# Patient Record
Sex: MALE | Race: BLACK OR AFRICAN AMERICAN | NOT HISPANIC OR LATINO | URBAN - METROPOLITAN AREA
[De-identification: names, ages, dates, MRNs, and addresses within clinical notes are randomized per-mention and may not be internally consistent; named-entity substitution may affect disease eponyms.]

---

## 2017-03-19 ENCOUNTER — HOSPITAL ENCOUNTER (EMERGENCY)
Facility: HOSPITAL | Age: 22
Discharge: HOME | End: 2017-03-19
Attending: EMERGENCY MEDICINE
Payer: COMMERCIAL

## 2017-03-19 VITALS
TEMPERATURE: 98 F | BODY MASS INDEX: 25.73 KG/M2 | WEIGHT: 190 LBS | HEIGHT: 72 IN | HEART RATE: 118 BPM | OXYGEN SATURATION: 98 % | DIASTOLIC BLOOD PRESSURE: 86 MMHG | SYSTOLIC BLOOD PRESSURE: 151 MMHG | RESPIRATION RATE: 20 BRPM

## 2017-03-19 DIAGNOSIS — J06.9 UPPER RESPIRATORY TRACT INFECTION, UNSPECIFIED TYPE: ICD-10-CM

## 2017-03-19 DIAGNOSIS — R50.9 FEVER, UNSPECIFIED FEVER CAUSE: Primary | ICD-10-CM

## 2017-03-19 LAB
DEPRECATED S PYO AG THROAT QL EIA: NEGATIVE
FLUAV AG SPEC QL IA: NEGATIVE
FLUBV AG SPEC QL IA: NEGATIVE
HIV1+2 IGG SERPL QL IA.RAPID: NEGATIVE
SPECIMEN SOURCE: NORMAL

## 2017-03-19 PROCEDURE — 87147 CULTURE TYPE IMMUNOLOGIC: CPT

## 2017-03-19 PROCEDURE — 99283 EMERGENCY DEPT VISIT LOW MDM: CPT

## 2017-03-19 PROCEDURE — 86701 HIV-1ANTIBODY: CPT

## 2017-03-19 PROCEDURE — 87081 CULTURE SCREEN ONLY: CPT

## 2017-03-19 PROCEDURE — 87880 STREP A ASSAY W/OPTIC: CPT

## 2017-03-19 PROCEDURE — 25000003 PHARM REV CODE 250: Performed by: EMERGENCY MEDICINE

## 2017-03-19 PROCEDURE — 87400 INFLUENZA A/B EACH AG IA: CPT

## 2017-03-19 RX ORDER — NAPROXEN 500 MG/1
500 TABLET ORAL 2 TIMES DAILY WITH MEALS
Qty: 30 TABLET | Refills: 0 | OUTPATIENT
Start: 2017-03-19 | End: 2023-10-07

## 2017-03-19 RX ORDER — ACETAMINOPHEN 500 MG
1000 TABLET ORAL
Status: COMPLETED | OUTPATIENT
Start: 2017-03-19 | End: 2017-03-19

## 2017-03-19 RX ADMIN — ACETAMINOPHEN 1000 MG: 500 TABLET ORAL at 06:03

## 2017-03-19 NOTE — ED PROVIDER NOTES
"SCRIBE #1 NOTE: I, Guicho Priscilla, am scribing for, and in the presence of, Troy Bernstein MD. I have scribed the entire note.      History      Chief Complaint   Patient presents with    Nausea     x 3 days, reports body aches, nasal congestion, and "just don't feel well"       Review of patient's allergies indicates:  No Known Allergies     HPI   HPI    3/19/2017, 5:21 PM   History obtained from the patient      History of Present Illness: Daniele Camacho is a 22 y.o. male patient who presents to the Emergency Department for sore throat which onset 2-3 days ago. He repots associated generalized myalgia and congestion. His sxs are constant and moderate in severity. The pt also is worried of possible STD exposure. He reports unprotected sex with multiple partners. The pt's sexual behavior is bisexual. Patient denies any headache, dizziness, light-headedness, CP, SOB, fever, chills, cough, abdominal pain, N/V/D or any other sx at this time. No further complaints or concerns at this time.     Arrival mode: Personal vehicle    PCP: No primary care provider on file.       Past Medical History:  No past medical history on file.    Past Surgical History:  No past surgical history on file.      Family History:  No family history on file.    Social History:  Social History     Social History Main Topics    Smoking status: Not on file    Smokeless tobacco: Not on file    Alcohol use Not on file    Drug use: Not on file    Sexual activity: Not on file       ROS   Review of Systems   Constitutional: Negative for chills and fever.        STD Exposure (+)   HENT: Negative for congestion and sore throat.    Respiratory: Negative for cough and shortness of breath.    Cardiovascular: Negative for chest pain.   Gastrointestinal: Positive for nausea. Negative for abdominal pain, diarrhea and vomiting.   Genitourinary: Negative for dysuria.   Musculoskeletal: Positive for myalgias (generalized). Negative for back pain. "   Skin: Negative for rash.   Neurological: Negative for dizziness, weakness, light-headedness, numbness and headaches.   Hematological: Does not bruise/bleed easily.       Physical Exam    Initial Vitals   BP Pulse Resp Temp SpO2   03/19/17 1652 03/19/17 1652 03/19/17 1652 03/19/17 1652 03/19/17 1652   151/86 118 20 99.6 °F (37.6 °C) 98 %      Physical Exam  Nursing Notes and Vital Signs Reviewed.  Constitutional: Patient is in no acute distress. Awake and alert. Well-developed and well-nourished.  Head: Atraumatic. Normocephalic.  Eyes: PERRL. EOM intact. Conjunctivae are not pale. No scleral icterus.  ENT: Mucous membranes are moist. Oropharynx is erythematous and symmetric.    Neck: Supple. Full ROM. No lymphadenopathy.  Cardiovascular: Regular rate. Regular rhythm. No murmurs, rubs, or gallops. Distal pulses are 2+ and symmetric.  Pulmonary/Chest: No respiratory distress. Clear to auscultation bilaterally. No wheezing, rales, or rhonchi.  Abdominal: Soft and non-distended.  There is no tenderness.  No rebound, guarding, or rigidity. Good bowel sounds.  Genitourinary: No CVA tenderness  Musculoskeletal: Moves all extremities. No obvious deformities. No edema. No calf tenderness.  Skin: Warm and dry.  Neurological:  Alert, awake, and appropriate.  Normal speech.  No acute focal neurological deficits are appreciated.  Psychiatric: Normal affect. Good eye contact. Appropriate in content.    ED Course    Procedures  ED Vital Signs:  Vitals:    03/19/17 1652 03/19/17 1830   BP: (!) 151/86    Pulse: (!) 118    Resp: 20    Temp: 99.6 °F (37.6 °C) 98.4 °F (36.9 °C)   TempSrc: Oral    SpO2: 98%    Weight: 86.2 kg (190 lb)    Height: 6' (1.829 m)        Abnormal Lab Results:  Labs Reviewed   THROAT SCREEN, RAPID   CULTURE, STREP A,  THROAT   INFLUENZA A AND B ANTIGEN   RAPID HIV        All Lab Results:  Results for orders placed or performed during the hospital encounter of 03/19/17   Rapid strep screen   Result Value Ref  Range    Rapid Strep A Screen Negative Negative   Influenza antigen Nasopharyngeal Swab   Result Value Ref Range    Influenza A Ag, EIA Negative Negative    Influenza B Ag, EIA Negative Negative    Flu A & B Source Nasopharyngeal Swab    Rapid HIV   Result Value Ref Range    HIV Rapid Testing Negative Negative          The Emergency Provider reviewed the vital signs and test results, which are outlined above.    ED Discussion     7:45 PM: Discussed with pt all pertinent ED information and results. Discussed pt dx and plan of tx. Gave pt all f/u and return to the ED instructions. All questions and concerns were addressed at this time. Pt expresses understanding of information and instructions, and is comfortable with plan to discharge. Pt is stable for discharge.    ED Medication(s):  Medications   acetaminophen tablet 1,000 mg (1,000 mg Oral Given 3/19/17 1830)       Discharge Medication List as of 3/19/2017  7:51 PM      START taking these medications    Details   naproxen (NAPROSYN) 500 MG tablet Take 1 tablet (500 mg total) by mouth 2 (two) times daily with meals., Starting 3/19/2017, Until Discontinued, Print             Follow-up Information     Follow up with your PCP In 3 days.        Follow up with [facility].    Specialty:  Emergency Medicine    Why:  If symptoms worsen, Or worsening condition or any other major concern    Contact information:    [address] [facility] [address] [phone]            Medical Decision Making    Medical Decision Making:   Clinical Tests:   Lab Tests: Ordered and Reviewed           Scribe Attestation:   Scribe #1: I performed the above scribed service and the documentation accurately describes the services I performed. I attest to the accuracy of the note.    Attending:   Physician Attestation Statement for Scribe #1: I, Troy Bernstein MD, personally performed the services described in this documentation, as scribed by  Guicho Wells, in my presence, and it is both accurate and complete.          Clinical Impression       ICD-10-CM ICD-9-CM   1. Fever, unspecified fever cause R50.9 780.60   2. Upper respiratory tract infection, unspecified type J06.9 465.9       Disposition:   Disposition: Discharged  Condition: Stable       Troy Bernstein MD  03/21/17 0505

## 2017-03-19 NOTE — ED AVS SNAPSHOT
[facility]  [address] [place] [id number]               Daniele Camacho   3/19/2017  4:58 PM   ED    Description:  Male : 1995   Department:  [facility]            Your Care was Coordinated By:     Provider Role From To    Troy Bernstein MD Attending Provider [id number] --      Reason for Visit     Nausea           Diagnoses this Visit        Comments    Fever, unspecified fever cause    -  Primary     Upper respiratory tract infection, unspecified type           ED Disposition     None           To Do List           Follow-up Information     Follow up with your PCP In 3 days.        Follow up with [facility].    Specialty:  Emergency Medicine    Why:  If symptoms worsen, Or worsening condition or any other major concern    Contact information:    [address] [place] [address] [phone]       These Medications        Disp Refills Start End    naproxen (NAPROSYN) 500 MG tablet 30 tablet 0 3/19/2017     Take 1 tablet (500 mg total) by mouth 2 (two) times daily with meals. - Oral      [facility]     [facility] -  Assistance  Registered nurses in the [facility] provide clinical advisement, health education, appointment booking, and other advisory services.  Call for this free service at [phone].             Medications           Message regarding Medications     Verify the changes and/or additions to your medication regime listed below are the same as discussed with your clinician today.  If any of these changes or additions are incorrect, please notify your healthcare provider.        START taking these NEW medications        Refills    naproxen (NAPROSYN) 500 MG tablet 0    Sig: Take 1 tablet (500 mg total) by mouth 2 (two) times daily with meals.    Class: Print    Route: Oral      These medications were administered today         Dose Freq    acetaminophen tablet 1,000 mg 1,000 mg ED 1 Time    Sig: Take 2 tablets (1,000 mg total) by mouth ED 1 Time.    Class: Normal    Route: Oral           Verify that the below list of medications is an accurate representation of the medications you are currently taking.  If none reported, the list may be blank. If incorrect, please contact your healthcare provider. Carry this list with you in case of emergency.           Current Medications     naproxen (NAPROSYN) 500 MG tablet Take 1 tablet (500 mg total) by mouth 2 (two) times daily with meals.           Clinical Reference Information           Your Vitals Were     BP Pulse Temp Resp Height Weight    151/86 (BP Location: Right arm, Patient Position: Sitting) 118 98.4 °F (36.9 °C) 20 6' (1.829 m) 86.2 kg (190 lb)    SpO2 BMI             98% 25.77 kg/m2         Allergies as of 3/19/2017     No Known Allergies      Immunizations Administered on Date of Encounter - 3/19/2017     None      ED Micro, Lab, POCT     Start Ordered       Status Ordering Provider    03/19/17 1723 03/19/17 1722  Influenza antigen Nasopharyngeal Swab  STAT      Final result     03/19/17 1723 03/19/17 1722  Rapid HIV  STAT      Final result     03/19/17 1722 03/19/17 1722  Rapid strep screen  STAT      Final result     03/19/17 1722 03/19/17 1722  Strep A culture, throat  Once      In process       ED Imaging Orders     None        Discharge Instructions         Febrile Illness, Uncertain Cause (Adult)  You have a fever, but the cause is not certain. A fever is a natural reaction of the body to an illness such as infection due to a virus or bacteria. In most cases, the temperature itself is not harmful. It actually helps the body fight infections. A fever does not need to be treated unless you feel very uncomfortable.  Sometimes a fever can be an early sign of a more serious infection, so make sure to follow up if your condition worsens.  Home care  Unless given other  instructions by your healthcare provider, follow these guidelines when caring for yourself at home.  General care  · If your symptoms are severe, rest at home for the first 2 to 3 days. When you resume activity, don't let yourself get too tired.  · Do not smoke. Also avoid being exposed to secondhand smoke.  · Your appetite may be poor, so a light diet is fine. Avoid dehydration by drinking 6 to 8 glasses of fluids per day (such as water, soft drinks, sports drinks, juices, tea, or soup). Extra fluids will help loosen secretions in the nose and lungs.  Medicines  · You can take acetaminophen or ibuprofen for pain, unless you were given a different fever-reducing/pain medicine to use. (Note: If you have chronic liver or kidney disease or have ever had a stomach ulcer or gastrointestinal bleeding, talk with your healthcare provider before using these medicines. Also talk to your provider if you are taking medicine to prevent blood clots.) Aspirin should never be given to anyone younger than 18 years of age who is ill with a viral infection or fever. It may cause severe liver or brain damage.  · If you were given antibiotics, take them until they are used up, or your healthcare provider tells you to stop. It is important to finish the antibiotics even though you feel better. This is to make sure the infection has cleared. Be aware that antibiotics are not usually given for a fever with an unknown cause.  · Over-the-counter medicines will not shorten the duration of the illness. However, they may be helpful for the following symptoms: cough, sore throat, or nasal and sinus congestion. Ask your pharmacist for product suggestions. (Note: Do not use decongestants if you have high blood pressure.)  Follow-up care  Follow up with your healthcare provider, or as advised.  · If a culture was done, you will be notified if your treatment needs to be changed. You can call as directed for the results.  · If X-rays, a CT, or an  ultrasound were done, a specialist will review them. You will be notified of any findings that may affect your care.  Call 911  Contact emergency services right away if any of these occur:  · Trouble breathing or swallowing, or wheezing  · Chest pain  · Confusion  · Extreme drowsiness or trouble awakening  · Fainting or loss of consciousness  · Rapid heart rate  · Low blood pressure  · Vomiting blood, or large amounts of blood in stool  · Seizure  When to seek medical advice  Call your healthcare provider right away if any of these occur:  · Cough with lots of colored sputum (mucus) or blood in your sputum  · Severe headache  · Face, neck, throat, or ear pain  · Feeling drowsy  · Abdominal pain  · Repeated vomiting or diarrhea  · Joint pain or a new rash  · Burning when urinating  · Fever of 100.4°F (38°C) or higher, that does not get better after taking fever-reducing medicine  · Feeling weak or dizzy  Date Last Reviewed: 7/30/2015 © 2000-2016 [employer]. [address] [place] [address]. All rights reserved. This information is not intended as a substitute for professional medical care. Always follow your healthcare professional's instructions.          Viral Upper Respiratory Illness (Adult)  You have a viral upper respiratory illness (URI), which is another term for the common cold. This illness is contagious during the first few days. It is spread through the air by coughing and sneezing. It may also be spread by direct contact (touching the sick person and then touching your own eyes, nose, or mouth). Frequent handwashing will decrease risk of spread. Most viral illnesses go away within 7 to 10 days with rest and simple home remedies. Sometimes the illness may last for several weeks. Antibiotics will not kill a virus, and they are generally not prescribed for this condition.    Home care  · If symptoms are severe, rest at home for the first 2 to 3 days. When you resume activity, don't  let yourself get too tired.  · Avoid being exposed to cigarette smoke (yours or others).  · You may use acetaminophen or ibuprofen to control pain and fever, unless another medicine was prescribed. (Note: If you have chronic liver or kidney disease, have ever had a stomach ulcer or gastrointestinal bleeding, or are taking blood-thinning medicines, talk with your healthcare provider before using these medicines.) Aspirin should never be given to anyone under 18 years of age who is ill with a viral infection or fever. It may cause severe liver or brain damage.  · Your appetite may be poor, so a light diet is fine. Avoid dehydration by drinking 6 to 8 glasses of fluids per day (water, soft drinks, juices, tea, or soup). Extra fluids will help loosen secretions in the nose and lungs.  · Over-the-counter cold medicines will not shorten the length of time youre sick, but they may be helpful for the following symptoms: cough, sore throat, and nasal and sinus congestion. (Note: Do not use decongestants if you have high blood pressure.)  Follow-up care  Follow up with your healthcare provider, or as advised.  When to seek medical advice  Call your healthcare provider right away if any of these occur:  · Cough with lots of colored sputum (mucus)  · Severe headache; face, neck, or ear pain  · Difficulty swallowing due to throat pain  · Fever of 100.4°F (38°C)  Call 911, or get immediate medical care  Call emergency services right away if any of these occur:  · Chest pain, shortness of breath, wheezing, or difficulty breathing  · Coughing up blood  · Inability to swallow due to throat pain  Date Last Reviewed: 9/13/2015 © 2000-2016 [employer]. [address] [place] [address]. All rights reserved. This information is not intended as a substitute for professional medical care. Always follow your healthcare professional's instructions.          Smoking Cessation     If you would like to quit  smoking:   You may be eligible for free services if you are a [place] resident and started smoking cigarettes before September 1, 1988.  Call the Smoking Cessation Trust (SCT) toll free at (395) 706-1383 or (626) 235-2577.   Call 2-800-QUIT-NOW if you do not meet the above criteria.             [facility] complies with applicable Federal civil rights laws and does not discriminate on the basis of race, color, national origin, age, disability, or sex.        Language Assistance Services     ATTENTION: Language assistance services are available, free of charge. Please call [phone].      ATENCIÓN: Si habla español, tiene a maravilla disposición servicios gratuitos de asistencia lingüística. Llame al [phone].     CHÚ Ý: N?u b?n nói Ti?ng Vi?t, có các d?ch v? h? tr? ngôn ng? mi?n phí dành cho b?n. G?i s? [phone].

## 2017-03-20 NOTE — DISCHARGE INSTRUCTIONS
Febrile Illness, Uncertain Cause (Adult)  You have a fever, but the cause is not certain. A fever is a natural reaction of the body to an illness such as infection due to a virus or bacteria. In most cases, the temperature itself is not harmful. It actually helps the body fight infections. A fever does not need to be treated unless you feel very uncomfortable.  Sometimes a fever can be an early sign of a more serious infection, so make sure to follow up if your condition worsens.  Home care  Unless given other instructions by your healthcare provider, follow these guidelines when caring for yourself at home.  General care  · If your symptoms are severe, rest at home for the first 2 to 3 days. When you resume activity, don't let yourself get too tired.  · Do not smoke. Also avoid being exposed to secondhand smoke.  · Your appetite may be poor, so a light diet is fine. Avoid dehydration by drinking 6 to 8 glasses of fluids per day (such as water, soft drinks, sports drinks, juices, tea, or soup). Extra fluids will help loosen secretions in the nose and lungs.  Medicines  · You can take acetaminophen or ibuprofen for pain, unless you were given a different fever-reducing/pain medicine to use. (Note: If you have chronic liver or kidney disease or have ever had a stomach ulcer or gastrointestinal bleeding, talk with your healthcare provider before using these medicines. Also talk to your provider if you are taking medicine to prevent blood clots.) Aspirin should never be given to anyone younger than 18 years of age who is ill with a viral infection or fever. It may cause severe liver or brain damage.  · If you were given antibiotics, take them until they are used up, or your healthcare provider tells you to stop. It is important to finish the antibiotics even though you feel better. This is to make sure the infection has cleared. Be aware that antibiotics are not usually given for a fever with an unknown  cause.  · Over-the-counter medicines will not shorten the duration of the illness. However, they may be helpful for the following symptoms: cough, sore throat, or nasal and sinus congestion. Ask your pharmacist for product suggestions. (Note: Do not use decongestants if you have high blood pressure.)  Follow-up care  Follow up with your healthcare provider, or as advised.  · If a culture was done, you will be notified if your treatment needs to be changed. You can call as directed for the results.  · If X-rays, a CT, or an ultrasound were done, a specialist will review them. You will be notified of any findings that may affect your care.  Call 911  Contact emergency services right away if any of these occur:  · Trouble breathing or swallowing, or wheezing  · Chest pain  · Confusion  · Extreme drowsiness or trouble awakening  · Fainting or loss of consciousness  · Rapid heart rate  · Low blood pressure  · Vomiting blood, or large amounts of blood in stool  · Seizure  When to seek medical advice  Call your healthcare provider right away if any of these occur:  · Cough with lots of colored sputum (mucus) or blood in your sputum  · Severe headache  · Face, neck, throat, or ear pain  · Feeling drowsy  · Abdominal pain  · Repeated vomiting or diarrhea  · Joint pain or a new rash  · Burning when urinating  · Fever of 100.4°F (38°C) or higher, that does not get better after taking fever-reducing medicine  · Feeling weak or dizzy  Date Last Reviewed: 7/30/2015 © 2000-2016 [employer]. [address] [place] [address]. All rights reserved. This information is not intended as a substitute for professional medical care. Always follow your healthcare professional's instructions.          Viral Upper Respiratory Illness (Adult)  You have a viral upper respiratory illness (URI), which is another term for the common cold. This illness is contagious during the first few days. It is spread through the air by  coughing and sneezing. It may also be spread by direct contact (touching the sick person and then touching your own eyes, nose, or mouth). Frequent handwashing will decrease risk of spread. Most viral illnesses go away within 7 to 10 days with rest and simple home remedies. Sometimes the illness may last for several weeks. Antibiotics will not kill a virus, and they are generally not prescribed for this condition.    Home care  · If symptoms are severe, rest at home for the first 2 to 3 days. When you resume activity, don't let yourself get too tired.  · Avoid being exposed to cigarette smoke (yours or others).  · You may use acetaminophen or ibuprofen to control pain and fever, unless another medicine was prescribed. (Note: If you have chronic liver or kidney disease, have ever had a stomach ulcer or gastrointestinal bleeding, or are taking blood-thinning medicines, talk with your healthcare provider before using these medicines.) Aspirin should never be given to anyone under 18 years of age who is ill with a viral infection or fever. It may cause severe liver or brain damage.  · Your appetite may be poor, so a light diet is fine. Avoid dehydration by drinking 6 to 8 glasses of fluids per day (water, soft drinks, juices, tea, or soup). Extra fluids will help loosen secretions in the nose and lungs.  · Over-the-counter cold medicines will not shorten the length of time youre sick, but they may be helpful for the following symptoms: cough, sore throat, and nasal and sinus congestion. (Note: Do not use decongestants if you have high blood pressure.)  Follow-up care  Follow up with your healthcare provider, or as advised.  When to seek medical advice  Call your healthcare provider right away if any of these occur:  · Cough with lots of colored sputum (mucus)  · Severe headache; face, neck, or ear pain  · Difficulty swallowing due to throat pain  · Fever of 100.4°F (38°C)  Call 911, or get immediate medical care  Call  emergency services right away if any of these occur:  · Chest pain, shortness of breath, wheezing, or difficulty breathing  · Coughing up blood  · Inability to swallow due to throat pain  Date Last Reviewed: 9/13/2015  © 0954-6740 [employer]. [address] [place] [address], [place] [address]. All rights reserved. This information is not intended as a substitute for professional medical care. Always follow your healthcare professional's instructions.

## 2017-03-21 LAB
BACTERIA THROAT CULT: NORMAL
BACTERIA THROAT CULT: NORMAL

## 2021-06-12 ENCOUNTER — HOSPITAL ENCOUNTER (EMERGENCY)
Facility: HOSPITAL | Age: 26
Discharge: HOME | End: 2021-06-12
Attending: EMERGENCY MEDICINE

## 2021-06-12 VITALS
HEIGHT: 72 IN | WEIGHT: 216.06 LBS | RESPIRATION RATE: 20 BRPM | HEART RATE: 110 BPM | OXYGEN SATURATION: 97 % | TEMPERATURE: 99 F | BODY MASS INDEX: 29.26 KG/M2 | SYSTOLIC BLOOD PRESSURE: 160 MMHG | DIASTOLIC BLOOD PRESSURE: 105 MMHG

## 2021-06-12 DIAGNOSIS — M54.50 LUMBAR BACK PAIN: Primary | ICD-10-CM

## 2021-06-12 PROCEDURE — 99281 EMR DPT VST MAYX REQ PHY/QHP: CPT

## 2021-06-12 RX ORDER — MORPHINE SULFATE 2 MG/ML
6 INJECTION, SOLUTION INTRAMUSCULAR; INTRAVENOUS
Status: DISCONTINUED | OUTPATIENT
Start: 2021-06-12 | End: 2021-06-12 | Stop reason: HOSPADM

## 2021-06-12 RX ORDER — ONDANSETRON 2 MG/ML
4 INJECTION INTRAMUSCULAR; INTRAVENOUS
Status: DISCONTINUED | OUTPATIENT
Start: 2021-06-12 | End: 2021-06-12 | Stop reason: HOSPADM

## 2023-10-07 ENCOUNTER — HOSPITAL ENCOUNTER (EMERGENCY)
Facility: HOSPITAL | Age: 28
Discharge: HOME | End: 2023-10-07
Attending: EMERGENCY MEDICINE
Payer: MEDICAID

## 2023-10-07 VITALS
HEART RATE: 91 BPM | SYSTOLIC BLOOD PRESSURE: 165 MMHG | DIASTOLIC BLOOD PRESSURE: 77 MMHG | WEIGHT: 210 LBS | HEIGHT: 71 IN | OXYGEN SATURATION: 100 % | RESPIRATION RATE: 20 BRPM | TEMPERATURE: 98 F | BODY MASS INDEX: 29.4 KG/M2
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
## 2023-10-07 DIAGNOSIS — S00.83XA CONTUSION OF FACE, INITIAL ENCOUNTER: ICD-10-CM

## 2023-10-07 DIAGNOSIS — S00.81XA ABRASION OF FACE, INITIAL ENCOUNTER: ICD-10-CM

## 2023-10-07 DIAGNOSIS — S16.1XXA STRAIN OF NECK MUSCLE, INITIAL ENCOUNTER: ICD-10-CM

## 2023-10-07 PROCEDURE — 25000003 PHARM REV CODE 250: Mod: ER | Performed by: EMERGENCY MEDICINE

## 2023-10-07 PROCEDURE — 99284 EMERGENCY DEPT VISIT MOD MDM: CPT | Mod: 25,ER

## 2023-10-07 RX ORDER — NAPROXEN 500 MG/1
500 TABLET ORAL 2 TIMES DAILY PRN
Qty: 14 TABLET | Refills: 1 | Status: SHIPPED | OUTPATIENT
Start: 2023-10-07

## 2023-10-07 RX ORDER — TRAMADOL HYDROCHLORIDE 50 MG/1
100 TABLET ORAL
Status: COMPLETED | OUTPATIENT
Start: 2023-10-07 | End: 2023-10-07

## 2023-10-07 RX ORDER — ZOLPIDEM TARTRATE 10 MG/1
10 TABLET ORAL NIGHTLY
COMMUNITY
Start: 2023-09-27

## 2023-10-07 RX ORDER — HYDROCODONE BITARTRATE AND ACETAMINOPHEN 10; 325 MG/1; MG/1
1 TABLET ORAL EVERY 12 HOURS PRN
Qty: 10 TABLET | Refills: 0 | Status: SHIPPED | OUTPATIENT
Start: 2023-10-07

## 2023-10-07 RX ORDER — NAPROXEN 500 MG/1
500 TABLET ORAL
Status: COMPLETED | OUTPATIENT
Start: 2023-10-07 | End: 2023-10-07

## 2023-10-07 RX ORDER — CYCLOBENZAPRINE HCL 10 MG
10 TABLET ORAL
Status: COMPLETED | OUTPATIENT
Start: 2023-10-07 | End: 2023-10-07

## 2023-10-07 RX ORDER — CYCLOBENZAPRINE HCL 10 MG
10 TABLET ORAL 2 TIMES DAILY PRN
Qty: 14 TABLET | Refills: 1 | Status: SHIPPED | OUTPATIENT
Start: 2023-10-07

## 2023-10-07 RX ORDER — ACETAMINOPHEN 500 MG
1000 TABLET ORAL
Status: COMPLETED | OUTPATIENT
Start: 2023-10-07 | End: 2023-10-07

## 2023-10-07 RX ORDER — AMLODIPINE BESYLATE 5 MG/1
5 TABLET ORAL DAILY
COMMUNITY

## 2023-10-07 RX ADMIN — TRAMADOL HYDROCHLORIDE 100 MG: 50 TABLET, COATED ORAL at 08:10

## 2023-10-07 RX ADMIN — ACETAMINOPHEN 1000 MG: 500 TABLET ORAL at 08:10

## 2023-10-07 RX ADMIN — CYCLOBENZAPRINE HYDROCHLORIDE 10 MG: 10 TABLET, FILM COATED ORAL at 08:10

## 2023-10-07 RX ADMIN — NAPROXEN 500 MG: 500 TABLET ORAL at 08:10
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
## 2023-10-07 NOTE — Clinical Note
"Daniele"NALDO Camacho was seen and treated in our emergency department on 10/7/2023.  He may return to work on 10/10/2023.       If you have any questions or concerns, please don't hesitate to call.      Jose Angel Silva MD"

## 2024-06-26 ENCOUNTER — HOSPITAL ENCOUNTER (EMERGENCY)
Facility: HOSPITAL | Age: 29
Discharge: HOME | End: 2024-06-27
Attending: EMERGENCY MEDICINE
Payer: MEDICAID

## 2024-06-26 DIAGNOSIS — N48.30 PRIAPISM: Primary | ICD-10-CM

## 2024-06-26 DIAGNOSIS — R33.9 URINARY RETENTION: ICD-10-CM

## 2024-06-26 RX ORDER — PHENYLEPHRINE HCL IN 0.9% NACL 1 MG/10 ML
500 SYRINGE (ML) INTRAVENOUS ONCE
Status: DISCONTINUED | OUTPATIENT
Start: 2024-06-27 | End: 2024-06-26

## 2024-06-26 RX ORDER — TERBUTALINE SULFATE 5 MG/1
5 TABLET ORAL ONCE
Status: COMPLETED | OUTPATIENT
Start: 2024-06-26 | End: 2024-06-27

## 2024-06-26 RX ORDER — LIDOCAINE HYDROCHLORIDE 10 MG/ML
5 INJECTION INFILTRATION; PERINEURAL
Status: COMPLETED | OUTPATIENT
Start: 2024-06-26 | End: 2024-06-27

## 2024-06-26 RX ORDER — TERBUTALINE SULFATE 5 MG/1
5 TABLET ORAL ONCE
Status: DISCONTINUED | OUTPATIENT
Start: 2024-06-27 | End: 2024-06-26

## 2024-06-26 RX ORDER — PHENYLEPHRINE HCL IN 0.9% NACL 1 MG/10 ML
100 SYRINGE (ML) INTRAVENOUS ONCE
Status: DISCONTINUED | OUTPATIENT
Start: 2024-06-27 | End: 2024-06-26

## 2024-06-26 RX ORDER — PHENYLEPHRINE HCL IN 0.9% NACL 1 MG/10 ML
500 SYRINGE (ML) INTRAVENOUS ONCE
Status: COMPLETED | OUTPATIENT
Start: 2024-06-27 | End: 2024-06-27

## 2024-06-27 ENCOUNTER — ANESTHESIA (OUTPATIENT)
Dept: SURGERY | Facility: HOSPITAL | Age: 29
End: 2024-06-27
Payer: MEDICAID

## 2024-06-27 ENCOUNTER — ANESTHESIA EVENT (OUTPATIENT)
Dept: SURGERY | Facility: HOSPITAL | Age: 29
End: 2024-06-27
Payer: MEDICAID

## 2024-06-27 ENCOUNTER — NURSE TRIAGE (OUTPATIENT)
Dept: ADMINISTRATIVE | Facility: CLINIC | Age: 29
End: 2024-06-27
Payer: MEDICAID

## 2024-06-27 VITALS
HEART RATE: 73 BPM | SYSTOLIC BLOOD PRESSURE: 155 MMHG | HEIGHT: 71 IN | WEIGHT: 220 LBS | DIASTOLIC BLOOD PRESSURE: 88 MMHG | OXYGEN SATURATION: 100 % | TEMPERATURE: 98 F | BODY MASS INDEX: 30.8 KG/M2 | RESPIRATION RATE: 18 BRPM

## 2024-06-27 PROBLEM — R33.9 URINARY RETENTION: Status: ACTIVE | Noted: 2024-06-27

## 2024-06-27 PROBLEM — N48.30 PRIAPISM: Status: ACTIVE | Noted: 2024-06-27

## 2024-06-27 LAB
ALBUMIN SERPL BCP-MCNC: 4.3 G/DL (ref 3.5–5.2)
ALLENS TEST: ABNORMAL
ALLENS TEST: ABNORMAL
ALP SERPL-CCNC: 43 U/L (ref 55–135)
ALT SERPL W/O P-5'-P-CCNC: 13 U/L (ref 10–44)
ANION GAP SERPL CALC-SCNC: 11 MMOL/L (ref 8–16)
AST SERPL-CCNC: 18 U/L (ref 10–40)
BASOPHILS # BLD AUTO: 0.01 K/UL (ref 0–0.2)
BASOPHILS NFR BLD: 0.2 % (ref 0–1.9)
BILIRUB SERPL-MCNC: 0.6 MG/DL (ref 0.1–1)
BUN SERPL-MCNC: 7 MG/DL (ref 6–20)
CALCIUM SERPL-MCNC: 9.3 MG/DL (ref 8.7–10.5)
CHLORIDE SERPL-SCNC: 105 MMOL/L (ref 95–110)
CO2 SERPL-SCNC: 22 MMOL/L (ref 23–29)
CREAT SERPL-MCNC: 1 MG/DL (ref 0.5–1.4)
DIFFERENTIAL METHOD BLD: ABNORMAL
EOSINOPHIL # BLD AUTO: 0.1 K/UL (ref 0–0.5)
EOSINOPHIL NFR BLD: 1.3 % (ref 0–8)
ERYTHROCYTE [DISTWIDTH] IN BLOOD BY AUTOMATED COUNT: 14.8 % (ref 11.5–14.5)
EST. GFR  (NO RACE VARIABLE): >60 ML/MIN/1.73 M^2
GLUCOSE SERPL-MCNC: 102 MG/DL (ref 70–110)
HCO3 UR-SCNC: ABNORMAL MMOL/L
HCO3 UR-SCNC: ABNORMAL MMOL/L
HCT VFR BLD AUTO: 40.7 % (ref 40–54)
HGB BLD-MCNC: 13.8 G/DL (ref 14–18)
IMM GRANULOCYTES # BLD AUTO: 0.01 K/UL (ref 0–0.04)
IMM GRANULOCYTES NFR BLD AUTO: 0.2 % (ref 0–0.5)
LYMPHOCYTES # BLD AUTO: 1.7 K/UL (ref 1–4.8)
LYMPHOCYTES NFR BLD: 36.3 % (ref 18–48)
MCH RBC QN AUTO: 28.1 PG (ref 27–31)
MCHC RBC AUTO-ENTMCNC: 33.9 G/DL (ref 32–36)
MCV RBC AUTO: 83 FL (ref 82–98)
MONOCYTES # BLD AUTO: 0.4 K/UL (ref 0.3–1)
MONOCYTES NFR BLD: 8.6 % (ref 4–15)
NEUTROPHILS # BLD AUTO: 2.5 K/UL (ref 1.8–7.7)
NEUTROPHILS NFR BLD: 53.4 % (ref 38–73)
NRBC BLD-RTO: 0 /100 WBC
PCO2 BLDA: <5 MMHG (ref 35–45)
PCO2 BLDA: <5 MMHG (ref 35–45)
PH SMN: 7.83 [PH] (ref 7.35–7.45)
PH SMN: 7.93 [PH] (ref 7.35–7.45)
PLATELET # BLD AUTO: 323 K/UL (ref 150–450)
PMV BLD AUTO: 9.1 FL (ref 9.2–12.9)
PO2 BLDA: 191 MMHG (ref 80–100)
PO2 BLDA: 199 MMHG (ref 40–60)
POC BE: ABNORMAL MMOL/L
POC BE: ABNORMAL MMOL/L
POC SATURATED O2: ABNORMAL %
POC SATURATED O2: ABNORMAL %
POC TCO2: ABNORMAL MMOL/L
POC TCO2: ABNORMAL MMOL/L
POTASSIUM SERPL-SCNC: 3.6 MMOL/L (ref 3.5–5.1)
PROT SERPL-MCNC: 8 G/DL (ref 6–8.4)
RBC # BLD AUTO: 4.91 M/UL (ref 4.6–6.2)
SAMPLE: ABNORMAL
SAMPLE: ABNORMAL
SITE: ABNORMAL
SITE: ABNORMAL
SODIUM SERPL-SCNC: 138 MMOL/L (ref 136–145)
WBC # BLD AUTO: 4.76 K/UL (ref 3.9–12.7)

## 2024-06-27 PROCEDURE — 36000704 HC OR TIME LEV I 1ST 15 MIN: Performed by: STUDENT IN AN ORGANIZED HEALTH CARE EDUCATION/TRAINING PROGRAM

## 2024-06-27 PROCEDURE — 63600175 PHARM REV CODE 636 W HCPCS: Performed by: NURSE ANESTHETIST, CERTIFIED REGISTERED

## 2024-06-27 PROCEDURE — D9220A PRA ANESTHESIA: Mod: ANES,,, | Performed by: ANESTHESIOLOGY

## 2024-06-27 PROCEDURE — 25000003 PHARM REV CODE 250: Performed by: EMERGENCY MEDICINE

## 2024-06-27 PROCEDURE — 63600175 PHARM REV CODE 636 W HCPCS: Performed by: EMERGENCY MEDICINE

## 2024-06-27 PROCEDURE — 37000009 HC ANESTHESIA EA ADD 15 MINS: Performed by: STUDENT IN AN ORGANIZED HEALTH CARE EDUCATION/TRAINING PROGRAM

## 2024-06-27 PROCEDURE — 80053 COMPREHEN METABOLIC PANEL: CPT | Performed by: EMERGENCY MEDICINE

## 2024-06-27 PROCEDURE — 54435 REVISION OF PENIS: CPT | Mod: ,,, | Performed by: STUDENT IN AN ORGANIZED HEALTH CARE EDUCATION/TRAINING PROGRAM

## 2024-06-27 PROCEDURE — 63600175 PHARM REV CODE 636 W HCPCS: Performed by: STUDENT IN AN ORGANIZED HEALTH CARE EDUCATION/TRAINING PROGRAM

## 2024-06-27 PROCEDURE — 85025 COMPLETE CBC W/AUTO DIFF WBC: CPT | Performed by: EMERGENCY MEDICINE

## 2024-06-27 PROCEDURE — 99284 EMERGENCY DEPT VISIT MOD MDM: CPT | Mod: ,,, | Performed by: STUDENT IN AN ORGANIZED HEALTH CARE EDUCATION/TRAINING PROGRAM

## 2024-06-27 PROCEDURE — D9220A PRA ANESTHESIA: Mod: CRNA,,, | Performed by: NURSE ANESTHETIST, CERTIFIED REGISTERED

## 2024-06-27 PROCEDURE — 36000705 HC OR TIME LEV I EA ADD 15 MIN: Performed by: STUDENT IN AN ORGANIZED HEALTH CARE EDUCATION/TRAINING PROGRAM

## 2024-06-27 PROCEDURE — 25000003 PHARM REV CODE 250: Performed by: NURSE ANESTHETIST, CERTIFIED REGISTERED

## 2024-06-27 PROCEDURE — 37000008 HC ANESTHESIA 1ST 15 MINUTES: Performed by: STUDENT IN AN ORGANIZED HEALTH CARE EDUCATION/TRAINING PROGRAM

## 2024-06-27 PROCEDURE — 25000003 PHARM REV CODE 250: Performed by: STUDENT IN AN ORGANIZED HEALTH CARE EDUCATION/TRAINING PROGRAM

## 2024-06-27 PROCEDURE — 71000033 HC RECOVERY, INTIAL HOUR: Performed by: STUDENT IN AN ORGANIZED HEALTH CARE EDUCATION/TRAINING PROGRAM

## 2024-06-27 RX ORDER — POLYETHYLENE GLYCOL 3350 17 G/17G
17 POWDER, FOR SOLUTION ORAL DAILY
Qty: 7 EACH | Refills: 0 | Status: SHIPPED | OUTPATIENT
Start: 2024-06-27 | End: 2024-07-04

## 2024-06-27 RX ORDER — DEXAMETHASONE SODIUM PHOSPHATE 4 MG/ML
INJECTION, SOLUTION INTRA-ARTICULAR; INTRALESIONAL; INTRAMUSCULAR; INTRAVENOUS; SOFT TISSUE
Status: DISCONTINUED | OUTPATIENT
Start: 2024-06-27 | End: 2024-06-27

## 2024-06-27 RX ORDER — GABAPENTIN 100 MG/1
100 CAPSULE ORAL 3 TIMES DAILY
Qty: 21 CAPSULE | Refills: 0 | Status: SHIPPED | OUTPATIENT
Start: 2024-06-27 | End: 2024-07-03 | Stop reason: SDUPTHER

## 2024-06-27 RX ORDER — MORPHINE SULFATE 4 MG/ML
4 INJECTION, SOLUTION INTRAMUSCULAR; INTRAVENOUS
Status: COMPLETED | OUTPATIENT
Start: 2024-06-27 | End: 2024-06-27

## 2024-06-27 RX ORDER — LABETALOL HYDROCHLORIDE 5 MG/ML
INJECTION, SOLUTION INTRAVENOUS
Status: DISCONTINUED | OUTPATIENT
Start: 2024-06-27 | End: 2024-06-27

## 2024-06-27 RX ORDER — SUCCINYLCHOLINE CHLORIDE 20 MG/ML
INJECTION INTRAMUSCULAR; INTRAVENOUS
Status: DISCONTINUED | OUTPATIENT
Start: 2024-06-27 | End: 2024-06-27

## 2024-06-27 RX ORDER — ONDANSETRON HYDROCHLORIDE 2 MG/ML
INJECTION, SOLUTION INTRAVENOUS
Status: DISCONTINUED | OUTPATIENT
Start: 2024-06-27 | End: 2024-06-27

## 2024-06-27 RX ORDER — PROPOFOL 10 MG/ML
VIAL (ML) INTRAVENOUS
Status: DISCONTINUED | OUTPATIENT
Start: 2024-06-27 | End: 2024-06-27

## 2024-06-27 RX ORDER — MIDAZOLAM HYDROCHLORIDE 1 MG/ML
INJECTION INTRAMUSCULAR; INTRAVENOUS
Status: DISCONTINUED | OUTPATIENT
Start: 2024-06-27 | End: 2024-06-27

## 2024-06-27 RX ORDER — CEFAZOLIN SODIUM 1 G/3ML
INJECTION, POWDER, FOR SOLUTION INTRAMUSCULAR; INTRAVENOUS
Status: DISCONTINUED | OUTPATIENT
Start: 2024-06-27 | End: 2024-06-27

## 2024-06-27 RX ORDER — SULFAMETHOXAZOLE AND TRIMETHOPRIM 800; 160 MG/1; MG/1
1 TABLET ORAL EVERY 12 HOURS
Qty: 10 TABLET | Refills: 0 | Status: SHIPPED | OUTPATIENT
Start: 2024-06-27 | End: 2024-07-02

## 2024-06-27 RX ORDER — HYDROMORPHONE HYDROCHLORIDE 2 MG/ML
0.2 INJECTION, SOLUTION INTRAMUSCULAR; INTRAVENOUS; SUBCUTANEOUS EVERY 5 MIN PRN
Status: DISCONTINUED | OUTPATIENT
Start: 2024-06-27 | End: 2024-06-27 | Stop reason: HOSPADM

## 2024-06-27 RX ORDER — HALOPERIDOL 5 MG/ML
0.5 INJECTION INTRAMUSCULAR EVERY 10 MIN PRN
Status: DISCONTINUED | OUTPATIENT
Start: 2024-06-27 | End: 2024-06-27 | Stop reason: HOSPADM

## 2024-06-27 RX ORDER — SODIUM CHLORIDE 0.9 % (FLUSH) 0.9 %
10 SYRINGE (ML) INJECTION
Status: DISCONTINUED | OUTPATIENT
Start: 2024-06-27 | End: 2024-06-27 | Stop reason: HOSPADM

## 2024-06-27 RX ORDER — ACETAMINOPHEN 10 MG/ML
1000 INJECTION, SOLUTION INTRAVENOUS ONCE
Status: DISCONTINUED | OUTPATIENT
Start: 2024-06-27 | End: 2024-06-27 | Stop reason: HOSPADM

## 2024-06-27 RX ORDER — OXYCODONE HYDROCHLORIDE 5 MG/1
5 TABLET ORAL EVERY 8 HOURS PRN
Qty: 10 TABLET | Refills: 0 | Status: SHIPPED | OUTPATIENT
Start: 2024-06-27

## 2024-06-27 RX ORDER — ROCURONIUM BROMIDE 10 MG/ML
INJECTION, SOLUTION INTRAVENOUS
Status: DISCONTINUED | OUTPATIENT
Start: 2024-06-27 | End: 2024-06-27

## 2024-06-27 RX ORDER — OXYCODONE HYDROCHLORIDE 5 MG/1
5 TABLET ORAL EVERY 8 HOURS PRN
Qty: 10 TABLET | Refills: 0 | Status: SHIPPED | OUTPATIENT
Start: 2024-06-27 | End: 2024-06-27 | Stop reason: SDUPTHER

## 2024-06-27 RX ORDER — BACITRACIN 500 [USP'U]/G
OINTMENT TOPICAL
Status: DISCONTINUED | OUTPATIENT
Start: 2024-06-27 | End: 2024-06-27 | Stop reason: HOSPADM

## 2024-06-27 RX ORDER — LIDOCAINE HYDROCHLORIDE 20 MG/ML
INJECTION INTRAVENOUS
Status: DISCONTINUED | OUTPATIENT
Start: 2024-06-27 | End: 2024-06-27

## 2024-06-27 RX ORDER — FENTANYL CITRATE 50 UG/ML
INJECTION, SOLUTION INTRAMUSCULAR; INTRAVENOUS
Status: DISCONTINUED | OUTPATIENT
Start: 2024-06-27 | End: 2024-06-27

## 2024-06-27 RX ORDER — ONDANSETRON HYDROCHLORIDE 2 MG/ML
4 INJECTION, SOLUTION INTRAVENOUS DAILY PRN
Status: DISCONTINUED | OUTPATIENT
Start: 2024-06-27 | End: 2024-06-27 | Stop reason: HOSPADM

## 2024-06-27 RX ADMIN — DEXAMETHASONE SODIUM PHOSPHATE 4 MG: 4 INJECTION, SOLUTION INTRAMUSCULAR; INTRAVENOUS at 04:06

## 2024-06-27 RX ADMIN — PROPOFOL 200 MG: 10 INJECTION, EMULSION INTRAVENOUS at 04:06

## 2024-06-27 RX ADMIN — CEFAZOLIN SODIUM 2 G: 1 POWDER, FOR SOLUTION INTRAMUSCULAR; INTRAVENOUS at 04:06

## 2024-06-27 RX ADMIN — ONDANSETRON 4 MG: 2 INJECTION, SOLUTION INTRAMUSCULAR; INTRAVENOUS at 04:06

## 2024-06-27 RX ADMIN — LABETALOL HYDROCHLORIDE 10 MG: 5 INJECTION, SOLUTION INTRAVENOUS at 05:06

## 2024-06-27 RX ADMIN — LIDOCAINE HYDROCHLORIDE 5 ML: 10 INJECTION, SOLUTION INFILTRATION; PERINEURAL at 12:06

## 2024-06-27 RX ADMIN — SUGAMMADEX 400 MG: 100 INJECTION, SOLUTION INTRAVENOUS at 05:06

## 2024-06-27 RX ADMIN — MIDAZOLAM HYDROCHLORIDE 2 MG: 1 INJECTION INTRAMUSCULAR; INTRAVENOUS at 04:06

## 2024-06-27 RX ADMIN — FENTANYL CITRATE 50 MCG: 0.05 INJECTION, SOLUTION INTRAMUSCULAR; INTRAVENOUS at 05:06

## 2024-06-27 RX ADMIN — MORPHINE SULFATE 4 MG: 4 INJECTION INTRAVENOUS at 01:06

## 2024-06-27 RX ADMIN — SODIUM CHLORIDE, SODIUM LACTATE, POTASSIUM CHLORIDE, AND CALCIUM CHLORIDE: .6; .31; .03; .02 INJECTION, SOLUTION INTRAVENOUS at 04:06

## 2024-06-27 RX ADMIN — MORPHINE SULFATE 4 MG: 4 INJECTION INTRAVENOUS at 03:06

## 2024-06-27 RX ADMIN — MORPHINE SULFATE 4 MG: 4 INJECTION INTRAVENOUS at 02:06

## 2024-06-27 RX ADMIN — Medication 500 MCG: at 12:06

## 2024-06-27 RX ADMIN — ROCURONIUM BROMIDE 50 MG: 10 INJECTION, SOLUTION INTRAVENOUS at 04:06

## 2024-06-27 RX ADMIN — SUCCINYLCHOLINE CHLORIDE 160 MG: 20 INJECTION, SOLUTION INTRAMUSCULAR; INTRAVENOUS at 04:06

## 2024-06-27 RX ADMIN — LIDOCAINE HYDROCHLORIDE 100 MG: 20 INJECTION, SOLUTION INTRAVENOUS at 04:06

## 2024-06-27 RX ADMIN — FENTANYL CITRATE 100 MCG: 0.05 INJECTION, SOLUTION INTRAMUSCULAR; INTRAVENOUS at 04:06

## 2024-06-27 RX ADMIN — TERBUTALINE SULFATE 5 MG: 5 TABLET ORAL at 12:06

## 2024-06-27 NOTE — ANESTHESIA PREPROCEDURE EVALUATION
06/27/2024  Daniele Camacho is a 29 y.o., male.  To undergo Procedure(s) (LRB):  INCISION, PENIS (N/A)     Denies CP/SOB/GERD/MI/CVA/URI symptoms.  METS > 4  NPO > 8    Past Medical History:  No past medical history on file.    Past Surgical History:  No past surgical history on file.    Social History:  Social History     Socioeconomic History    Marital status: Single       Medications:  No current facility-administered medications on file prior to encounter.     Current Outpatient Medications on File Prior to Encounter   Medication Sig Dispense Refill    amLODIPine (NORVASC) 5 MG tablet Take 5 mg by mouth once daily.      cyclobenzaprine (FLEXERIL) 10 MG tablet Take 1 tablet (10 mg total) by mouth 2 (two) times daily as needed for Muscle spasms. 14 tablet 1    HYDROcodone-acetaminophen (NORCO)  mg per tablet Take 1 tablet by mouth every 12 (twelve) hours as needed for Pain. 10 tablet 0    naproxen (NAPROSYN) 500 MG tablet Take 1 tablet (500 mg total) by mouth 2 (two) times daily as needed (For pain/ inflammation). 14 tablet 1    zolpidem (AMBIEN) 10 mg Tab Take 10 mg by mouth every evening.         Allergies:  Review of patient's allergies indicates:  No Known Allergies    Active Problems:  There is no problem list on file for this patient.      Diagnostic Studies:    CBC:  Recent Labs   Lab 06/27/24  0124   WBC 4.76   RBC 4.91   HGB 13.8*   HCT 40.7      MCV 83   MCH 28.1   MCHC 33.9        CMP:  Recent Labs   Lab 06/27/24  0124   CALCIUM 9.3   ALBUMIN 4.3   PROT 8.0      K 3.6   CO2 22*      BUN 7   CREATININE 1.0   ALKPHOS 43*   ALT 13   AST 18   BILITOT 0.6     24 Hour Vitals:  Pulse:  [75-97] 95  Resp:  [16-25] 25  SpO2:  [95 %-99 %] 97 %  BP: (146-180)/() 155/103   See Nursing Charting For Additional Vitals      Pre-op Assessment    I have reviewed the Patient  Summary Reports.     I have reviewed the Nursing Notes.       Review of Systems  Anesthesia Hx:  No previous Anesthesia                Social:  Non-Smoker, No Alcohol Use       Cardiovascular:  Exercise tolerance: good   Hypertension                                        Pulmonary:  Pulmonary Normal                       Renal/:     Priapism             Hepatic/GI:  Hepatic/GI Normal                 Neurological:  Neurology Normal                                      Endocrine:        Obesity / BMI > 30      Physical Exam  General: Well nourished and Cooperative    Airway:  Mallampati: II   Mouth Opening: Normal  TM Distance: Normal    Dental:  Intact    Chest/Lungs:  Clear to auscultation, Normal Respiratory Rate    Heart:  Rate: Normal  Rhythm: Regular Rhythm        Anesthesia Plan  Type of Anesthesia, risks & benefits discussed:    Anesthesia Type: Gen ETT  Intra-op Monitoring Plan: Standard ASA Monitors  Post Op Pain Control Plan: multimodal analgesia and IV/PO Opioids PRN  Induction:  IV and rapid sequence  Airway Plan: Video, Post-Induction  Informed Consent: Informed consent signed with the Patient and all parties understand the risks and agree with anesthesia plan.  All questions answered.   ASA Score: 2 Emergent  Anesthesia Plan Notes:   GA with OETT, RSI  Standard ASA monitors  Recovery in PACU  PONV: 2    Ready For Surgery From Anesthesia Perspective.     .

## 2024-06-27 NOTE — SUBJECTIVE & OBJECTIVE
No past medical history on file.    No past surgical history on file.    Review of patient's allergies indicates:  No Known Allergies    Family History    None         Tobacco Use    Smoking status: Not on file    Smokeless tobacco: Not on file   Substance and Sexual Activity    Alcohol use: Not on file    Drug use: Not on file    Sexual activity: Not on file       Review of Systems   Constitutional:  Negative for activity change, appetite change, chills, diaphoresis and fever.   HENT:  Negative for congestion and rhinorrhea.    Eyes:  Negative for visual disturbance.   Respiratory:  Negative for choking and shortness of breath.    Gastrointestinal:  Negative for abdominal distention, abdominal pain, constipation, diarrhea, nausea and vomiting.   Genitourinary:  Positive for difficulty urinating and penile pain. Negative for dysuria, flank pain, hematuria, scrotal swelling, testicular pain and urgency.   Skin:  Negative for color change, pallor and wound.   Neurological:  Negative for dizziness and syncope.   Psychiatric/Behavioral:  Negative for confusion and hallucinations.        Objective:     Temp:  [98.6 °F (37 °C)] 98.6 °F (37 °C)  Pulse:  [75-97] 95  Resp:  [16-25] 21  SpO2:  [94 %-99 %] 96 %  BP: (146-180)/() 160/98  Weight: 99.8 kg (220 lb)  Body mass index is 30.68 kg/m².           Drains       None                    Physical Exam  Constitutional:       General: He is not in acute distress.     Appearance: He is well-developed. He is not ill-appearing, toxic-appearing or diaphoretic.   HENT:      Head: Normocephalic and atraumatic.      Mouth/Throat:      Mouth: Mucous membranes are moist.   Eyes:      Conjunctiva/sclera: Conjunctivae normal.   Pulmonary:      Effort: Pulmonary effort is normal. No respiratory distress.   Abdominal:      General: Abdomen is flat.      Palpations: Abdomen is soft.   Genitourinary:     Comments: Bilateral firm corpora cavernosa, soft glans  Penile dressing in place  from prior irrigation/aspiration attempt  Musculoskeletal:         General: No swelling or deformity.      Cervical back: Neck supple.   Skin:     Findings: No rash.   Neurological:      Mental Status: He is alert and oriented to person, place, and time.      Gait: Gait normal.   Psychiatric:         Mood and Affect: Mood normal.         Thought Content: Thought content normal.         Judgment: Judgment normal.          Significant Labs:    BMP:  Recent Labs   Lab 06/27/24  0124      K 3.6      CO2 22*   BUN 7   CREATININE 1.0   CALCIUM 9.3       CBC:  Recent Labs   Lab 06/27/24  0124   WBC 4.76   HGB 13.8*   HCT 40.7

## 2024-06-27 NOTE — HPI
30 yo man presented to the ED w/ persistent erection since 5-6 am on 6/26. Patient arrived to ED at 11 pm without resolution of erection w/ injection of 1 mg of PE per ED staff. Urology called to assist.     Patient notes 1 week ago similar occurrence of prolonged painful erection with acute onset. Patient notes he was seen in [facility] erection resolved with injection, while en route to [facility] ED his erection resolved. He is on seroquel with recent increase in dosage. He denies cocaine usage. Notes remote marijuana usage. He denies hx of SS/ trait. At time of evaluation he also reports urinary retention, bladder scan with about 600 cc. No recent trauma to penis.

## 2024-06-27 NOTE — DISCHARGE INSTRUCTIONS
Follow up with psychiatrist, discontinue seroquel as it is a risk factor for priapism    Okay to shower in 48 hours    No soaking in bath/ body of water for 6 weeks to prevent wound infection    Avoid constipation by taking provided laxative    Apply antibiotic ointment provided to head of penis every 12 hours

## 2024-06-27 NOTE — TELEPHONE ENCOUNTER
Pt message sent to provider.  ----- Message from Jessica Arthur sent at 6/27/2024  8:57 AM CDT -----  Regarding: SELF [phone]  Type: Patient Call Back     Who called: SELF     What is the request in detail: pt called because he needs oxyCODONE (ROXICODONE) 5 MG immediate release tablet transferred to different pharmacy     Can the clinic reply by [facility]?-No     Would the patient rather a call back or a response via [facility]?-Call Back     Best call back number: [phone]     Additional Information:   [id number] [place] [address] [place] [address] [address] [place] [address]  Phone: [phone] Fax: [phone]

## 2024-06-27 NOTE — PROGRESS NOTES
Urology called just now regarding priapism, onset 8 am, arrival to ED 4 hours ago, failed 1 mg of PE at bedside per ED at 1 am approx.  House supervisor notified to call in OR team for emergent procedure.

## 2024-06-27 NOTE — ASSESSMENT & PLAN NOTE
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 Discussed post operatively he will experience persistent firmness to his penis due to edema, fibrosis, healing. Discussed it is very likely he will need a penile prosthesis to manage his erectile dysfunction in the future. Discussed risk of bleeding, pain, scar formation, etc.  He verbalized understanding  His seroquel will need to be discontinued with an alternative medication sought after as it has risk for priapism development

## 2024-06-27 NOTE — TRANSFER OF CARE
"Anesthesia Transfer of Care Note    Patient: Daniele Camacho    Procedure(s) Performed: Procedure(s) (LRB):  Incision, Penis, DIstal Penile Shunt (N/A)    Patient location: PACU    Anesthesia Type: general    Transport from OR: Transported from OR on room air with adequate spontaneous ventilation    Post pain: adequate analgesia    Post assessment: no apparent anesthetic complications and tolerated procedure well    Post vital signs: stable    Level of consciousness: awake, alert and oriented    Nausea/Vomiting: no nausea/vomiting    Complications: none    Transfer of care protocol was followed      Last vitals: Visit Vitals  BP (!) 163/97 (BP Location: Left arm)   Pulse 92   Temp 36.8 °C (98.2 °F) (Oral)   Resp 17   Ht 5' 11" (1.803 m)   Wt 99.8 kg (220 lb)   SpO2 96%   BMI 30.68 kg/m²     "

## 2024-06-27 NOTE — ANESTHESIA PROCEDURE NOTES
Intubation    Date/Time: 6/27/2024 4:50 AM    Performed by: Zi Martinez CRNA  Authorized by: Angelo Todd MD    Intubation:     Induction:  Rapid sequence induction    Intubated:  Postinduction    Mask Ventilation:  Not attempted    Attempts:  1    Attempted By:  CRNA    Method of Intubation:  Video laryngoscopy    Blade:  Perrin 3 (X3 Blade)    Laryngeal View Grade: Grade I - full view of cords      Difficult Airway Encountered?: No      Complications:  None    Airway Device:  Oral endotracheal tube    Airway Device Size:  7.5    Style/Cuff Inflation:  Cuffed (inflated to minimal occlusive pressure)    Inflation Amount (mL):  5    Tube secured:  23    Secured at:  The lips    Placement Verified By:  Capnometry    Complicating Factors:  None    Findings Post-Intubation:  BS equal bilateral and atraumatic/condition of teeth unchanged

## 2024-06-27 NOTE — BRIEF OP NOTE
[facility] [place]  Brief Operative Note    Surgery Date: 6/27/2024     Surgeons and Role:     * Donald Smith MD - Primary    Assisting Surgeon: None    Pre-op Diagnosis:   Priapism    Post-op Diagnosis:  Post-Op Diagnosis Codes:     * Priapism [N48.30]    Procedure(s) (LRB):  Distal penile shunt (N/A)    Anesthesia: Choice    Operative Findings:   Rigid corpora bilaterally  T shunt performed on the distal R site of penis dorsal and lateral to the urethra meatus. Alleviation of dark  ischemic blood, about 100 cc  Blood gas sent after shunt created  Neal placed 600 cc of clear and yellow urine alleviated    Estimated Blood Loss: 100 cc         Specimens:   Corporal blood gas        Discharge Note    OUTCOME: Patient tolerated treatment/procedure well without complication and is now ready for discharge.    DISPOSITION: Home or Self Care    FINAL DIAGNOSIS:  Priapism    FOLLOWUP: In clinic    DISCHARGE INSTRUCTIONS:    Discharge Procedure Orders   Diet general     Call MD for:  temperature >100.4     Call MD for:  persistent nausea and vomiting     Call MD for:  severe uncontrolled pain     Call MD for:  difficulty breathing, headache or visual disturbances     Change dressing (specify)   Order Comments: Remove dressing in 24 hours or sooner if saturated     Activity as tolerated        Clinical Reference Documents Added to Patient Instructions         Document    ERECTILE DYSFUNCTION (ENGLISH)    PRIAPISM (ENGLISH)

## 2024-06-27 NOTE — ED NOTES
Patient states that he has had an erection since 0600 this morning,. Pt states he has tried ICE and a muscle relaxer and he is unable to make his erection go down,. Pt states this has happened before and it was r/t his sequel. Pt states his dose was recently increased. Pt states he is able to urinate small amount but not very often.  Pt states last time this happened that they did an injection that didn't work and his penis eventually went back to normal

## 2024-06-27 NOTE — PROGRESS NOTES
Arthur Celaya not available via phone ( number not in service) and not in the surgery waiting area when I sought to update the family    Available by phone until 7 am ( surgery at another campus)

## 2024-06-27 NOTE — OR NURSING
Patient awake, alert, orient.  Dressing to penis dry and intact.  VS baseline and stable.  Discharge instructions reviewed, questions answered.  Significant other at bedside. DC home in stable condition.

## 2024-06-27 NOTE — CONSULTS
[facility] - [place] Emergency Dept  Urology  Consult Note    Patient Name: Daniele Camacho  MRN: [id number]  Admission Date: 6/26/2024  Hospital Length of Stay: 0   Code Status: No Order   Attending Provider: Camilo Shaffer MD   Consulting Provider: Donald Smith MD  Primary Care Physician: Jose Yeung, FNP-C  Principal Problem:<principal problem not specified>    Inpatient consult to Urology  Consult performed by: Donald Smith MD  Consult ordered by: Camilo Shaffer MD  Reason for consult: priapism, prolonged  Assessment/Recommendations: To OR for management          Subjective:     HPI:  28 yo man presented to the ED w/ persistent erection since 5-6 am on 6/26. Patient arrived to ED at 11 pm without resolution of erection w/ injection of 1 mg of PE per ED staff. Urology called to assist.     Patient notes 1 week ago similar occurrence of prolonged painful erection with acute onset. Patient notes he was seen in R erection resolved with injection, while en route to [facility] ED his erection resolved. He is on seroquel with recent increase in dosage. He denies cocaine usage. Notes remote marijuana usage. He denies hx of SS/ trait. At time of evaluation he also reports urinary retention, bladder scan with about 600 cc. No recent trauma to penis.     No past medical history on file.    No past surgical history on file.    Review of patient's allergies indicates:  No Known Allergies    Family History    None         Tobacco Use    Smoking status: Not on file    Smokeless tobacco: Not on file   Substance and Sexual Activity    Alcohol use: Not on file    Drug use: Not on file    Sexual activity: Not on file       Review of Systems   Constitutional:  Negative for activity change, appetite change, chills, diaphoresis and fever.   HENT:  Negative for congestion and rhinorrhea.    Eyes:  Negative for visual disturbance.   Respiratory:  Negative for choking and shortness of breath.    Gastrointestinal:   Negative for abdominal distention, abdominal pain, constipation, diarrhea, nausea and vomiting.   Genitourinary:  Positive for difficulty urinating and penile pain. Negative for dysuria, flank pain, hematuria, scrotal swelling, testicular pain and urgency.   Skin:  Negative for color change, pallor and wound.   Neurological:  Negative for dizziness and syncope.   Psychiatric/Behavioral:  Negative for confusion and hallucinations.        Objective:     Temp:  [98.6 °F (37 °C)] 98.6 °F (37 °C)  Pulse:  [75-97] 95  Resp:  [16-25] 21  SpO2:  [94 %-99 %] 96 %  BP: (146-180)/() 160/98  Weight: 99.8 kg (220 lb)  Body mass index is 30.68 kg/m².           Drains       None                    Physical Exam  Constitutional:       General: He is not in acute distress.     Appearance: He is well-developed. He is not ill-appearing, toxic-appearing or diaphoretic.   HENT:      Head: Normocephalic and atraumatic.      Mouth/Throat:      Mouth: Mucous membranes are moist.   Eyes:      Conjunctiva/sclera: Conjunctivae normal.   Pulmonary:      Effort: Pulmonary effort is normal. No respiratory distress.   Abdominal:      General: Abdomen is flat.      Palpations: Abdomen is soft.   Genitourinary:     Comments: Bilateral firm corpora cavernosa, soft glans  Penile dressing in place from prior irrigation/aspiration attempt  Musculoskeletal:         General: No swelling or deformity.      Cervical back: Neck supple.   Skin:     Findings: No rash.   Neurological:      Mental Status: He is alert and oriented to person, place, and time.      Gait: Gait normal.   Psychiatric:         Mood and Affect: Mood normal.         Thought Content: Thought content normal.         Judgment: Judgment normal.          Significant Labs:    BMP:  Recent Labs   Lab 06/27/24  0124      K 3.6      CO2 22*   BUN 7   CREATININE 1.0   CALCIUM 9.3       CBC:  Recent Labs   Lab 06/27/24  0124   WBC 4.76   HGB 13.8*   HCT 40.7                       Assessment and Plan:     Urinary retention  Likely 2/2 to pain from priapism without baseline risk factors for retention  Will place bradshaw catheter intraoperatively  Anticipated to resolve, must pass voiding trial prior to discharge    Priapism  Prolonged painful erection consistent with ischemic priapism  No acute trauma  Failed bedside irrigation/aspiration w/ ED physician  Given prolonged erection will not wait for blood gas from penis as it will not  at this time  Additional irrigation/aspiration with phenylephrine is not indicated given prolonged duration of erection  Discussed w/ nearly 24 hours of persistent painful erection the risk of erectile dysfunction is nearly 100 %  Discussed need for distal penile shunt with or without corporal tunneling to OR emergently. Discussed post operatively he will experience persistent firmness to his penis due to edema, fibrosis, healing. Discussed it is very likely he will need a penile prosthesis to manage his erectile dysfunction in the future. Discussed risk of bleeding, pain, scar formation, etc.  He verbalized understanding  His seroquel will need to be discontinued with an alternative medication sought after as it has risk for priapism development            VTE Risk Mitigation (From admission, onward)      None            Thank you for your consult.     Donald Smith MD  [place]rology  [facility] [place]

## 2024-06-27 NOTE — ASSESSMENT & PLAN NOTE
Likely 2/2 to pain from priapism without baseline risk factors for retention  Will place bradshaw catheter intraoperatively  Anticipated to resolve, must pass voiding trial prior to discharge

## 2024-06-27 NOTE — TELEPHONE ENCOUNTER
Pt reports urology surgery today 6/27/2024. Pt reports he was seen in ER for erection that would not go away and had to have surgery this morning. Pt reports Oxycodone 5 mg prescription was send to wrong pharmacy in [place]. Urology on call, Xochitl Anand, was called at [facility] [phone] for [facility] [place] contacted about prescription and person stated that someone will send prescription to correct pharmacy in [place]. Pt made aware and verbalized understanding.   Reason for Disposition   Prescription not at pharmacy and was prescribed by PCP recently  (Exception: triager has access to EMR and prescription is recorded there. Go to Home Care and confirm for pharmacy.)    Protocols used: Medication Refill and Renewal Call-A-[place]

## 2024-06-27 NOTE — ED PROVIDER NOTES
Encounter Date: 6/26/2024    SCRIBE #1 NOTE: I, Raina Benson, am scribing for, and in the presence of,  Camilo Shaffer MD.       History     Chief Complaint   Patient presents with    Erection     Patient arrives with erection since 0600 this morning. Only medication he has taken was seroquel.      30 yo M with PMHx HTN presenting to the ED for persistent erection since 8 AM today. He reports h/o similar issues in the past and was told this was d/t his seroquel usage. His provider has since decreased his seroquel dosage. He denies any illicit drug use recently. He does admit to nonadherence w/ his antihypertensives. No other exacerbating or alleviating factors. No other associated symptoms.     The history is provided by the patient.     Review of patient's allergies indicates:  No Known Allergies  No past medical history on file.  No past surgical history on file.  No family history on file.     Review of Systems   Gastrointestinal:  Negative for abdominal pain.   Genitourinary:  Positive for penile pain and penile swelling. Negative for dysuria, frequency, scrotal swelling and testicular pain.        +persistent erection       Physical Exam     Initial Vitals   BP Pulse Resp Temp SpO2   06/26/24 2318 06/26/24 2318 06/26/24 2318 06/27/24 0430 06/26/24 2318   (!) 150/107 97 (!) 22 98.6 °F (37 °C) 98 %      MAP       --                Physical Exam    Nursing note and vitals reviewed.  Constitutional: He appears well-developed. He is not diaphoretic. No distress.   HENT:   Head: Normocephalic.   Eyes: EOM are normal.   Cardiovascular:  Normal rate and regular rhythm.           No murmur heard.  Pulmonary/Chest: Effort normal and breath sounds normal. He has no wheezes.   Abdominal: Abdomen is soft. He exhibits no distension. There is no abdominal tenderness. Hernia confirmed negative in the right inguinal area and confirmed negative in the left inguinal area.   Genitourinary: Right testis shows no mass, no swelling and  no tenderness. Left testis shows no mass, no swelling and no tenderness.    Genitourinary Comments: Priapism present.     Musculoskeletal:         General: Normal range of motion.     Neurological: He is alert.   Skin: Skin is warm.         ED Course   I & D - Incision and Drainage    Date/Time: 6/27/2024 6:11 AM  Location procedure was performed: [facility] EMERGENCY DEPARTMENT    Performed by: Camilo Shaffer MD  Authorized by: Camilo Shaffer MD  Consent Done: Yes  Consent: Verbal consent obtained. Written consent obtained.  Risks and benefits: risks, benefits and alternatives were discussed  Consent given by: patient  Patient understanding: patient states understanding of the procedure being performed  Patient consent: the patient's understanding of the procedure matches consent given  Procedure consent: procedure consent matches procedure scheduled  Relevant documents: relevant documents present and verified  Required items: required blood products, implants, devices, and special equipment available  Patient identity confirmed: verbally with patient  Indications for incision and drainage: Priapism.  Anesthesia method: Dorsal nerve block.    Anesthesia:  Local Anesthetic: lidocaine 1% without epinephrine  Anesthetic total: 2 mL  Comments:   Total of 90 mL of blood aspirated.  20 mL from the left corpus cavernosum.  70 mL right corpus cavernosum.    200 mcg phenylephrine injected into the left corpus cavernosum.  800 mcg injected into the right corpus cavernosum.        Labs Reviewed   CBC W/ AUTO DIFFERENTIAL - Abnormal; Notable for the following components:       Result Value    Hemoglobin 13.8 (*)     RDW 14.8 (*)     MPV 9.1 (*)     All other components within normal limits   COMPREHENSIVE METABOLIC PANEL - Abnormal; Notable for the following components:    CO2 22 (*)     Alkaline Phosphatase 43 (*)     All other components within normal limits   URINALYSIS, REFLEX TO URINE CULTURE   DRUG SCREEN PANEL, URINE  EMERGENCY          Imaging Results    None          Medications   sodium chloride 0.9% flush 10 mL (has no administration in time range)   acetaminophen 1,000 mg/100 mL (10 mg/mL) injection 1,000 mg (has no administration in time range)   HYDROmorphone (PF) injection 0.2 mg (has no administration in time range)   ondansetron injection 4 mg (has no administration in time range)   haloperidol lactate injection 0.5 mg (has no administration in time range)   LIDOcaine HCL 10 mg/ml (1%) injection 5 mL (5 mLs Infiltration Given by Provider 6/27/24 0001)   terbutaline tablet 5 mg (5 mg Oral Given 6/27/24 0000)   phenylephrine HCl in 0.9% NaCl 1 mg/10 mL (100 mcg/mL) syringe 500 mcg (500 mcg Intravenous Given by Provider 6/27/24 0001)   morphine injection 4 mg (4 mg Intravenous Given 6/27/24 0122)   morphine injection 4 mg (4 mg Intravenous Given 6/27/24 0211)   morphine injection 4 mg (4 mg Intravenous Given 6/27/24 0345)     Medical Decision Making    29-year-old male presenting for priapism.  This would be the 2nd episode in the last 1 week per discussion with the patient.  Patient denies any illicit drug use or alcohol use.  Denies any use medications for erectile dysfunction taken.  The patient reports only taking Seroquel to help for sleep.  On initial evaluation patient appeared to have priapism.  Patient was initially provided terbutaline p.o..  Discussed risks and benefits aspiration and injection of phenylephrine with goal for do tumescence.  Patient agreeable to plan.  We discussed possible benefits and possible complications.  Roughly 90 mL of blood removed.  A total of 1000 mcg was injected into the cavernosum with 800 mcg in the right and 200 mcg in the left.  Given lack of improvement urology was consulted for possible surgical fixation.  Patient was provided multiple doses of analgesia due to severity of pain.    Consultation with Urology Dr. Smith.  Check ED course work for details of  conversation    Differential diagnosis includes priapism secondary to adverse effect of prescription medication    Amount and/or Complexity of Data Reviewed  Labs: ordered.    Risk  Prescription drug management.  Decision regarding hospitalization.            Scribe Attestation:   Scribe #1: I performed the above scribed service and the documentation accurately describes the services I performed. I attest to the accuracy of the note.        ED Course as of 06/27/24 0752   Thu Jun 27, 2024   0113 Patient received a total of 2 mL bilaterally for total of 4 mL of phenylephrine (400 mcg total).    On re-evaluation no improvement.  Patient complaining of pain. [JM]   0157 Removed another 50 mL of blood from the right corpus cavernosum.  Injected the remainder 800 mcg Q 3 minutes with some improvement but still priapism present.  Patient unable to urinate. [JM]   0305 Minimal improvement    Urology consult, Dr. Smith    Discussed the current situation of minimal improvement. Discussed that I would like help for possible surgical intervention given minimal improvement. States that she plans on taking the patient to the OR. Discussed the patient's need to urinate however recommends against bradshaw. Recommends bladder scan only.    [JM]      ED Course User Index  [JM] Camilo Shaffer MD                         I, Camilo Shaffer, personally performed the services described in this documentation. All medical record entries made by the scribe were at my direction and in my presence. I have reviewed the chart and agree that the record reflects my personal performance and is accurate and complete.      Clinical Impression:  Final diagnoses:  [N48.30] Priapism (Primary)          ED Disposition Condition    Admit Stable                Caimlo Shaffer MD  06/27/24 0613       Camilo Shaffer MD  06/27/24 0731       Camilo Shaffer MD  06/27/24 0752

## 2024-06-27 NOTE — OP NOTE
DATE OF PROCEDURE:  6/27/2024     PREOPERATIVE DIAGNOSIS:  penile priapism, urinary retention     POSTOPERATIVE DIAGNOSIS:  as above     PROCEDURE PERFORMED:   Distal penile shunt, T shunt  Bradshaw catheter placement    PRIMARY SURGEON:  Donald Smith M.D.     ANESTHESIA:  General.     ESTIMATED BLOOD LOSS:  100cc.     DRAINS: 16 fr bradshaw catheter     SPECIMENS REMOVED:  corporal blood gas.     COMPLICATIONS:  None.     INDICATIONS: Nearly 24 hours of ischemic priapism, failed bedside corporal irrigation /aspiration with 1 mg of phenylepherine. Risk of erectile dysfunction, pain, swelling, infection, bleeding discussed preoperatively.        PROCEDURE DETAILS:   Mr. Camacho was taken to the Operating Room where he was positively identified by geoff.  He was placed supine on the operating room table.  Following induction of adequate general anesthesia, he was placed in the supine  position and his external genitalia and scrotum were prepped and draped in usual sterile Fashion. A preoperative timeout was performed as well as confirmation of preoperative antibiotics.  Pre intervention examination revealed bilateral tense/rigid corporal with soft glans. He had a hematoma on the dorsum of his penis consistent with prior irrigation/aspiration attempt. No blood at the urethral meatus. I placed an 11 blade into the tip of the R corpora to create a distal shunt due to the length of priapism and risk of thick coagulated blood. The 11 blade was turned laterally away from the urethra to avoid injury to the urethra. Upon creation of the shunt dark crude oil appearing blood escaped from the shunt site, I expressed blood from the shunt until bright arterial blood was encountered, I sent the bright red blood for blood gas analysis. I then closed the shunt with a 2-0 chronic suture in a figure 8 fashion. I placed a 16 fr  bradshaw catheter due to pre operative urinary retention. The bradshaw was secured with 10 cc of sterile water,  kept to gravity drainage. I wrapped the penile in a gentle dressing to provide compression to the previously existing hematoma. The corpora were much softer consistent with resolution of ischemic priapism. He has now been converted to AV fistula and will have a partial erection.      His anesthesia was reversed.  He was then taken to the Recovery Room in stable   condition.

## 2024-06-27 NOTE — ED NOTES
Bladder scan-- 650-730ML urine, MD aware, urology would like to hold off on bradshaw, pt will be going to OR. Pt is aware.

## 2024-06-27 NOTE — ED NOTES
Pt given medication per MAR, pt states he is in pain, pt states he can not sit down, pt standing on side of bed, pt aware that transport will be coming to get him for the OR

## 2024-06-30 ENCOUNTER — HOSPITAL ENCOUNTER (EMERGENCY)
Facility: HOSPITAL | Age: 29
Discharge: HOME | End: 2024-06-30
Attending: EMERGENCY MEDICINE
Payer: MEDICAID

## 2024-06-30 VITALS
HEART RATE: 105 BPM | DIASTOLIC BLOOD PRESSURE: 94 MMHG | BODY MASS INDEX: 29.99 KG/M2 | TEMPERATURE: 99 F | OXYGEN SATURATION: 99 % | WEIGHT: 215 LBS | SYSTOLIC BLOOD PRESSURE: 187 MMHG | RESPIRATION RATE: 16 BRPM

## 2024-06-30 DIAGNOSIS — R00.0 TACHYCARDIA: ICD-10-CM

## 2024-06-30 DIAGNOSIS — G89.18 POST-OPERATIVE PAIN: Primary | ICD-10-CM

## 2024-06-30 DIAGNOSIS — N48.30 PRIAPISM: ICD-10-CM

## 2024-06-30 LAB
ALBUMIN SERPL BCP-MCNC: 4.2 G/DL (ref 3.5–5.2)
ALP SERPL-CCNC: 38 U/L (ref 55–135)
ALT SERPL W/O P-5'-P-CCNC: 12 U/L (ref 10–44)
ANION GAP SERPL CALC-SCNC: 12 MMOL/L (ref 8–16)
AST SERPL-CCNC: 15 U/L (ref 10–40)
BACTERIA #/AREA URNS HPF: ABNORMAL /HPF
BASOPHILS # BLD AUTO: 0.01 K/UL (ref 0–0.2)
BASOPHILS NFR BLD: 0.2 % (ref 0–1.9)
BILIRUB SERPL-MCNC: 0.3 MG/DL (ref 0.1–1)
BILIRUB UR QL STRIP: NEGATIVE
BUN SERPL-MCNC: 12 MG/DL (ref 6–20)
CALCIUM SERPL-MCNC: 9.9 MG/DL (ref 8.7–10.5)
CHLORIDE SERPL-SCNC: 107 MMOL/L (ref 95–110)
CLARITY UR: ABNORMAL
CO2 SERPL-SCNC: 22 MMOL/L (ref 23–29)
COLOR UR: ABNORMAL
CREAT SERPL-MCNC: 1.1 MG/DL (ref 0.5–1.4)
DIFFERENTIAL METHOD BLD: ABNORMAL
EOSINOPHIL # BLD AUTO: 0 K/UL (ref 0–0.5)
EOSINOPHIL NFR BLD: 0.8 % (ref 0–8)
ERYTHROCYTE [DISTWIDTH] IN BLOOD BY AUTOMATED COUNT: 15.3 % (ref 11.5–14.5)
EST. GFR  (NO RACE VARIABLE): >60 ML/MIN/1.73 M^2
GLUCOSE SERPL-MCNC: 111 MG/DL (ref 70–110)
GLUCOSE UR QL STRIP: NEGATIVE
HCT VFR BLD AUTO: 39.6 % (ref 40–54)
HGB BLD-MCNC: 12.9 G/DL (ref 14–18)
HGB UR QL STRIP: ABNORMAL
IMM GRANULOCYTES # BLD AUTO: 0.01 K/UL (ref 0–0.04)
IMM GRANULOCYTES NFR BLD AUTO: 0.2 % (ref 0–0.5)
KETONES UR QL STRIP: NEGATIVE
LACTATE SERPL-SCNC: 1 MMOL/L (ref 0.5–2.2)
LEUKOCYTE ESTERASE UR QL STRIP: NEGATIVE
LYMPHOCYTES # BLD AUTO: 1.7 K/UL (ref 1–4.8)
LYMPHOCYTES NFR BLD: 35.6 % (ref 18–48)
MCH RBC QN AUTO: 28.1 PG (ref 27–31)
MCHC RBC AUTO-ENTMCNC: 32.6 G/DL (ref 32–36)
MCV RBC AUTO: 86 FL (ref 82–98)
MICROSCOPIC COMMENT: ABNORMAL
MONOCYTES # BLD AUTO: 0.3 K/UL (ref 0.3–1)
MONOCYTES NFR BLD: 6.7 % (ref 4–15)
NEUTROPHILS # BLD AUTO: 2.7 K/UL (ref 1.8–7.7)
NEUTROPHILS NFR BLD: 56.5 % (ref 38–73)
NITRITE UR QL STRIP: NEGATIVE
NRBC BLD-RTO: 0 /100 WBC
PH UR STRIP: 6 [PH] (ref 5–8)
PLATELET # BLD AUTO: 346 K/UL (ref 150–450)
PMV BLD AUTO: 9.3 FL (ref 9.2–12.9)
POTASSIUM SERPL-SCNC: 3.4 MMOL/L (ref 3.5–5.1)
PROT SERPL-MCNC: 7.9 G/DL (ref 6–8.4)
PROT UR QL STRIP: ABNORMAL
RBC # BLD AUTO: 4.59 M/UL (ref 4.6–6.2)
RBC #/AREA URNS HPF: >100 /HPF (ref 0–4)
SODIUM SERPL-SCNC: 141 MMOL/L (ref 136–145)
SP GR UR STRIP: 1.03 (ref 1–1.03)
URN SPEC COLLECT METH UR: ABNORMAL
UROBILINOGEN UR STRIP-ACNC: ABNORMAL EU/DL
WBC # BLD AUTO: 4.78 K/UL (ref 3.9–12.7)
WBC #/AREA URNS HPF: 1 /HPF (ref 0–5)

## 2024-06-30 PROCEDURE — 93005 ELECTROCARDIOGRAM TRACING: CPT

## 2024-06-30 PROCEDURE — 99284 EMERGENCY DEPT VISIT MOD MDM: CPT | Mod: 25

## 2024-06-30 PROCEDURE — 96375 TX/PRO/DX INJ NEW DRUG ADDON: CPT

## 2024-06-30 PROCEDURE — 80053 COMPREHEN METABOLIC PANEL: CPT | Performed by: EMERGENCY MEDICINE

## 2024-06-30 PROCEDURE — 85025 COMPLETE CBC W/AUTO DIFF WBC: CPT | Performed by: EMERGENCY MEDICINE

## 2024-06-30 PROCEDURE — 25000003 PHARM REV CODE 250: Performed by: EMERGENCY MEDICINE

## 2024-06-30 PROCEDURE — 83605 ASSAY OF LACTIC ACID: CPT | Performed by: EMERGENCY MEDICINE

## 2024-06-30 PROCEDURE — 81000 URINALYSIS NONAUTO W/SCOPE: CPT | Performed by: EMERGENCY MEDICINE

## 2024-06-30 PROCEDURE — 93010 ELECTROCARDIOGRAM REPORT: CPT | Mod: ,,, | Performed by: INTERNAL MEDICINE

## 2024-06-30 PROCEDURE — 87086 URINE CULTURE/COLONY COUNT: CPT | Performed by: EMERGENCY MEDICINE

## 2024-06-30 PROCEDURE — 63600175 PHARM REV CODE 636 W HCPCS: Performed by: EMERGENCY MEDICINE

## 2024-06-30 PROCEDURE — 96374 THER/PROPH/DIAG INJ IV PUSH: CPT

## 2024-06-30 PROCEDURE — 96361 HYDRATE IV INFUSION ADD-ON: CPT

## 2024-06-30 RX ORDER — HYDROMORPHONE HYDROCHLORIDE 1 MG/ML
1 INJECTION, SOLUTION INTRAMUSCULAR; INTRAVENOUS; SUBCUTANEOUS
Status: COMPLETED | OUTPATIENT
Start: 2024-06-30 | End: 2024-06-30

## 2024-06-30 RX ORDER — OXYCODONE AND ACETAMINOPHEN 7.5; 325 MG/1; MG/1
1 TABLET ORAL EVERY 6 HOURS PRN
Qty: 15 TABLET | Refills: 0 | OUTPATIENT
Start: 2024-06-30 | End: 2024-07-05

## 2024-06-30 RX ADMIN — HYDROMORPHONE HYDROCHLORIDE 1 MG: 1 INJECTION, SOLUTION INTRAMUSCULAR; INTRAVENOUS; SUBCUTANEOUS at 07:06

## 2024-06-30 RX ADMIN — SODIUM CHLORIDE 1000 ML: 9 INJECTION, SOLUTION INTRAVENOUS at 05:06

## 2024-06-30 RX ADMIN — HYDROMORPHONE HYDROCHLORIDE 1 MG: 1 INJECTION, SOLUTION INTRAMUSCULAR; INTRAVENOUS; SUBCUTANEOUS at 05:06

## 2024-06-30 NOTE — ED PROVIDER NOTES
Encounter Date: 6/30/2024    SCRIBE #1 NOTE: I, Kareem Carr Do, am scribing for, and in the presence of,  Michael Montgomery MD. I have scribed the following portions of the note - Other sections scribed: HPI, ROS, PE, MDM.       History     Chief Complaint   Patient presents with    Post-op Problem     Had surgery for priaprism 6/26. Having increased pain now & blood in urine     Daniele Camacho is a 29 y.o. male, with a pertinent PMHx of priapism and HTN, who presents to the ED with worsening post-operative penile pain onset 3 days ago. Patient reports associated mild hematuria. Prior to this operation, he endorses Seroquel with a persistent erection greater than 24 hours. No other exacerbating or alleviating factors. Patient denies testicular pain, fever, or other associated symptoms.     Per chart review 06/27  Incision, Penis, Distal Penile Shunt    The history is provided by the patient. No  was used.     Review of patient's allergies indicates:  No Known Allergies  Past Medical History:   Diagnosis Date    Urinary retention 06/27/2024     Past Surgical History:   Procedure Laterality Date    INCISION OF PENIS N/A 6/27/2024    Procedure: Incision, Penis, DIstal Penile Shunt;  Surgeon: Donald Smith MD;  Location: [facility] OR;  Service: Urology;  Laterality: N/A;  11 blade, 50 cc syringes     No family history on file.     Review of Systems   Constitutional:  Negative for chills and fever.   HENT:  Negative for congestion.    Respiratory:  Negative for cough and shortness of breath.    Gastrointestinal:  Negative for abdominal pain, diarrhea and nausea.   Genitourinary:  Positive for hematuria and penile pain. Negative for dysuria and testicular pain.   Musculoskeletal:  Negative for back pain.   Skin:  Negative for rash.   Neurological:  Negative for headaches.   Psychiatric/Behavioral:  Negative for suicidal ideas.        Physical Exam     Initial Vitals [06/30/24 1712]   BP Pulse  Resp Temp SpO2   (!) 171/97 (!) 126 18 98.8 °F (37.1 °C) 97 %      MAP       --         Physical Exam    Nursing note and vitals reviewed.  Constitutional: He appears well-developed and well-nourished.   HENT:   Head: Atraumatic.   Eyes: EOM are normal. Pupils are equal, round, and reactive to light.   Neck: Neck supple. No JVD present.   Normal range of motion.  Cardiovascular:  Regular rhythm, normal heart sounds and intact distal pulses.     Exam reveals no gallop and no friction rub.       No murmur heard.  Tachycardic about 120's.   Pulmonary/Chest: Breath sounds normal. No respiratory distress.   Abdominal: Abdomen is soft. Bowel sounds are normal. No hernia. Hernia confirmed negative in the left inguinal area and confirmed negative in the right inguinal area.   Genitourinary:    Genitourinary Comments: Patient provided verbal consent for  exam. Glands are not enlarged. Base of the penis is firm and tender.      Musculoskeletal:         General: Normal range of motion.      Cervical back: Normal range of motion and neck supple.     Lymphadenopathy:     He has no cervical adenopathy. No inguinal adenopathy noted on the right or left side.   Neurological: He is alert and oriented to person, place, and time. He has normal strength.   Skin: Skin is warm and dry.   Psychiatric: He has a normal mood and affect. Thought content normal.         ED Course   Procedures  Labs Reviewed   CBC W/ AUTO DIFFERENTIAL - Abnormal; Notable for the following components:       Result Value    RBC 4.59 (*)     Hemoglobin 12.9 (*)     Hematocrit 39.6 (*)     RDW 15.3 (*)     All other components within normal limits   COMPREHENSIVE METABOLIC PANEL - Abnormal; Notable for the following components:    Potassium 3.4 (*)     CO2 22 (*)     Glucose 111 (*)     Alkaline Phosphatase 38 (*)     All other components within normal limits   URINALYSIS, REFLEX TO URINE CULTURE - Abnormal; Notable for the following components:    Color, UA  Orange (*)     Appearance, UA Hazy (*)     Protein, UA Trace (*)     Occult Blood UA 3+ (*)     Urobilinogen, UA 2.0-3.0 (*)     All other components within normal limits    Narrative:     Specimen Source->Urine   URINALYSIS MICROSCOPIC - Abnormal; Notable for the following components:    RBC, UA >100 (*)     All other components within normal limits    Narrative:     Specimen Source->Urine   CULTURE, URINE    Narrative:     Indicated criteria for high risk culture:->Prior to urologic  procedures   LACTIC ACID, PLASMA        ECG Results              EKG 12-lead (Final result)        Collection Time Result Time QRS Duration OHS QTC Calculation    06/30/24 17:46:41 07/01/24 19:23:30 88 399                     Final result by Interface, Lab In [facility] (07/01/24 19:23:33)                   Narrative:    Test Reason : R00.0,    Vent. Rate : 108 BPM     Atrial Rate : 108 BPM     P-R Int : 178 ms          QRS Dur : 088 ms      QT Int : 298 ms       P-R-T Axes : 078 094 005 degrees     QTc Int : 399 ms    Sinus tachycardia  Rightward axis  ST and T wave abnormality, consider inferior ischemia  ST and T wave abnormality, consider anterior ischemia  Abnormal ECG  No previous ECGs available  Confirmed by Max Jerome MD ([id number]) on 7/1/2024 7:23:26 PM    Referred By: [email or web address]   SELF           Confirmed By:Max Jerome MD                                  Imaging Results    None          Medications   sodium chloride 0.9% bolus 1,000 mL 1,000 mL (0 mLs Intravenous Stopped 6/30/24 1850)   HYDROmorphone injection 1 mg (1 mg Intravenous Given 6/30/24 1751)   HYDROmorphone injection 1 mg (1 mg Intravenous Given 6/30/24 1936)     Medical Decision Making  Differential diagnosis include but are not limited to: Recurrent priapism, infection, urethral damage.    This is an emergent evaluation of a 29 y.o. male who presents with a pertinent PMHx of priapism and HTN, who presents to the ED with worsening post-operative penile pain onset 3  days ago. The patient was seen and examined. The history and physical exam was obtained. The nursing notes and vital signs were reviewed. Secondary to symptoms and examination findings, I ordered labs and EKG. Will treat with HYDROmorphone injection 1 mg and IV fluids.    Amount and/or Complexity of Data Reviewed  External Data Reviewed: notes.     Details: See HPI.  Labs: ordered. Decision-making details documented in ED Course.  ECG/medicine tests: ordered. Decision-making details documented in ED Course.    Risk  Prescription drug management.    No evidence of infection to the surgical site.  Patient states his erection is approximately 40% the size of it was pre surgical.  Has not changed or increased since discharge.  He has run out of his pain prescription.  States he has been having light pink hematuria since he left hospital.  Bladder scan ordered.  Urinalysis ordered.  Basic labs ordered.  CBC CMP is normal.  Specifically no elevated white cell count or change in renal function.  Patient's blood pressure and heart rate controlled now with pain control.  I discussed the case with Dr. Barcenas with Urology.  He states that the patient may need a cystoscope.  No further imaging or interventions needed at this time in the emergency room.  I sent at his request a staff message to Dr. Smith for follow up this week and potential cystoscope. Assuming patient can urinate and has no retention patient can be discharge with pain medication refill. Pt already on bactrim. Will send culture. Dr. Castro to follow UA results and bladder scan and determine final disposition.         Scribe Attestation:   Scribe #1: I performed the above scribed service and the documentation accurately describes the services I performed. I attest to the accuracy of the note.        ED Course as of 07/04/24 1608   Liliya Jun 30, 2024 1924 Bladder scan less than 30 mL and UA shows red blood cells with no white blood cells.  Patient is already  on Bactrim prophylactically. [BA]      ED Course User Index  [BA] Ernesto Castro MD          I, Michael Montgomery, personally performed the services described in this documentation.  All medical record entries made by the scribe were at my direction and in my presence.  I have reviewed the chart and agree that the record reflects my personal performance and is accurate and complete.                   Clinical Impression:  Final diagnoses:  [R00.0] Tachycardia  [G89.18] Post-operative pain (Primary)  [N48.30] Priapism          ED Disposition Condition    Discharge Stable          ED Prescriptions       Medication Sig Dispense Start Date End Date Auth. Provider    oxyCODONE-acetaminophen (PERCOCET) 7.5-325 mg per tablet Take 1 tablet by mouth every 6 (six) hours as needed for Pain. 15 tablet 6/30/2024 -- Michael Montgomery MD          Follow-up Information       Follow up With Specialties Details Why Contact Info    Donald Smith MD Urology  this week. [address]   [facility] [address]  [phone]               Michael Montgomery MD  06/30/24 3995       Michael Montgomery MD  07/04/24 0217

## 2024-07-01 LAB
OHS QRS DURATION: 88 MS
OHS QTC CALCULATION: 399 MS

## 2024-07-01 RX ORDER — GABAPENTIN 100 MG/1
CAPSULE ORAL
Qty: 21 CAPSULE | Refills: 0 | OUTPATIENT
Start: 2024-07-01

## 2024-07-01 NOTE — TELEPHONE ENCOUNTER
----- Message from Donald Smith MD sent at 7/1/2024  4:31 AM CDT -----  Regarding: fu andres  Please notify patient of follow up appointment this upcoming Wednesday

## 2024-07-01 NOTE — ANESTHESIA POSTPROCEDURE EVALUATION
Anesthesia Post Evaluation    Patient: Daniele Camacho    Procedure(s) Performed: Procedure(s) (LRB):  Incision, Penis, DIstal Penile Shunt (N/A)    Final Anesthesia Type: general      Patient location during evaluation: PACU  Patient participation: Yes- Able to Participate  Level of consciousness: awake and alert and oriented  Post-procedure vital signs: reviewed and stable  Pain management: adequate  Airway patency: patent    PONV status at discharge: No PONV  Anesthetic complications: no      Cardiovascular status: hemodynamically stable and blood pressure returned to baseline  Respiratory status: spontaneous ventilation, room air and unassisted  Hydration status: euvolemic  Follow-up not needed.              Vitals Value Taken Time   /94 06/30/24 1902   Temp 37.1 °C (98.8 °F) 06/30/24 1712   Pulse 105 06/30/24 1902   Resp 16 06/30/24 1936   SpO2 99 % 06/30/24 1902         Event Time   Out of Recovery 06/27/2024 07:19:00         Pain/Ez Score: Presence of Pain: denies (6/30/2024  7:38 PM)  Pain Rating Prior to Med Admin: 8 (6/30/2024  7:36 PM)  Pain Rating Post Med Admin: 4 (6/30/2024  6:10 PM)

## 2024-07-02 LAB — BACTERIA UR CULT: NORMAL

## 2024-07-03 ENCOUNTER — OFFICE VISIT (OUTPATIENT)
Dept: UROLOGY | Facility: CLINIC | Age: 29
End: 2024-07-03
Payer: MEDICAID

## 2024-07-03 ENCOUNTER — TELEPHONE (OUTPATIENT)
Dept: UROLOGY | Facility: CLINIC | Age: 29
End: 2024-07-03
Payer: MEDICAID

## 2024-07-03 VITALS — WEIGHT: 230.94 LBS | BODY MASS INDEX: 32.21 KG/M2

## 2024-07-03 DIAGNOSIS — R31.9 HEMATURIA, UNSPECIFIED TYPE: ICD-10-CM

## 2024-07-03 DIAGNOSIS — N48.89 PENILE SWELLING: Primary | ICD-10-CM

## 2024-07-03 PROBLEM — R33.9 URINARY RETENTION: Status: RESOLVED | Noted: 2024-06-27 | Resolved: 2024-07-03

## 2024-07-03 PROCEDURE — 99999 PR PBB SHADOW E&M-EST. PATIENT-LVL II: CPT | Mod: PBBFAC,,, | Performed by: STUDENT IN AN ORGANIZED HEALTH CARE EDUCATION/TRAINING PROGRAM

## 2024-07-03 PROCEDURE — 99024 POSTOP FOLLOW-UP VISIT: CPT | Mod: ,,, | Performed by: STUDENT IN AN ORGANIZED HEALTH CARE EDUCATION/TRAINING PROGRAM

## 2024-07-03 PROCEDURE — 4010F ACE/ARB THERAPY RXD/TAKEN: CPT | Mod: CPTII,,, | Performed by: STUDENT IN AN ORGANIZED HEALTH CARE EDUCATION/TRAINING PROGRAM

## 2024-07-03 PROCEDURE — 1159F MED LIST DOCD IN RCRD: CPT | Mod: CPTII,,, | Performed by: STUDENT IN AN ORGANIZED HEALTH CARE EDUCATION/TRAINING PROGRAM

## 2024-07-03 PROCEDURE — 99212 OFFICE O/P EST SF 10 MIN: CPT | Mod: PBBFAC | Performed by: STUDENT IN AN ORGANIZED HEALTH CARE EDUCATION/TRAINING PROGRAM

## 2024-07-03 PROCEDURE — 87086 URINE CULTURE/COLONY COUNT: CPT | Performed by: STUDENT IN AN ORGANIZED HEALTH CARE EDUCATION/TRAINING PROGRAM

## 2024-07-03 RX ORDER — GABAPENTIN 100 MG/1
100 CAPSULE ORAL 3 TIMES DAILY
Qty: 21 CAPSULE | Refills: 0 | Status: SHIPPED | OUTPATIENT
Start: 2024-07-03 | End: 2024-07-10

## 2024-07-03 RX ORDER — IBUPROFEN 800 MG/1
800 TABLET ORAL 3 TIMES DAILY
Qty: 15 TABLET | Refills: 0 | OUTPATIENT
Start: 2024-07-03 | End: 2024-07-05

## 2024-07-03 NOTE — PROGRESS NOTES
Post op evaluation for T shunt for priapism  Patient notes persistent pain where irrigation and aspiration attempts were not successful  His T shunt is healing well. He notes gross hematuria and dysuria after his admission which has resolved. Denies flank pain, nausea, vomiting. Stopped seroquel. Has SS trait. No repeat erections since procedure.       Exam w/ well healed T shunt, no drainage  At 3/9 oclock there are indurations which are tender.     Urine culture  CT renal stone  Cystoscopy in 4 weeks to allow for swelling to resolve.   Suspect hematuria is from his prior irrigation/aspiration attempts as no trauma to the urethra occurred during the T shunt    Gabapentin/ Ibuprofen renewed for persistent penile pain which is anticipated given his recent procedure    Ok to return to work 7/6/24

## 2024-07-05 ENCOUNTER — HOSPITAL ENCOUNTER (EMERGENCY)
Facility: HOSPITAL | Age: 29
Discharge: HOME | End: 2024-07-05
Attending: EMERGENCY MEDICINE
Payer: MEDICAID

## 2024-07-05 VITALS
BODY MASS INDEX: 29.99 KG/M2 | DIASTOLIC BLOOD PRESSURE: 89 MMHG | OXYGEN SATURATION: 100 % | WEIGHT: 215 LBS | TEMPERATURE: 99 F | HEART RATE: 100 BPM | SYSTOLIC BLOOD PRESSURE: 150 MMHG | RESPIRATION RATE: 18 BRPM

## 2024-07-05 DIAGNOSIS — R42 DIZZINESS: ICD-10-CM

## 2024-07-05 DIAGNOSIS — Z87.438 HISTORY OF PRIAPISM: ICD-10-CM

## 2024-07-05 DIAGNOSIS — R00.0 SINUS TACHYCARDIA: ICD-10-CM

## 2024-07-05 DIAGNOSIS — N48.89 PENILE PAIN: Primary | ICD-10-CM

## 2024-07-05 LAB
ALBUMIN SERPL BCP-MCNC: 4.3 G/DL (ref 3.5–5.2)
ALP SERPL-CCNC: 42 U/L (ref 55–135)
ALT SERPL W/O P-5'-P-CCNC: 12 U/L (ref 10–44)
ANION GAP SERPL CALC-SCNC: 11 MMOL/L (ref 8–16)
AST SERPL-CCNC: 15 U/L (ref 10–40)
BACTERIA UR CULT: NORMAL
BASOPHILS # BLD AUTO: 0.01 K/UL (ref 0–0.2)
BASOPHILS NFR BLD: 0.2 % (ref 0–1.9)
BILIRUB SERPL-MCNC: 0.3 MG/DL (ref 0.1–1)
BILIRUB UR QL STRIP: NEGATIVE
BUN SERPL-MCNC: 7 MG/DL (ref 6–20)
CALCIUM SERPL-MCNC: 9.7 MG/DL (ref 8.7–10.5)
CHLORIDE SERPL-SCNC: 107 MMOL/L (ref 95–110)
CLARITY UR: CLEAR
CO2 SERPL-SCNC: 23 MMOL/L (ref 23–29)
COLOR UR: COLORLESS
CREAT SERPL-MCNC: 0.9 MG/DL (ref 0.5–1.4)
DIFFERENTIAL METHOD BLD: ABNORMAL
EOSINOPHIL # BLD AUTO: 0 K/UL (ref 0–0.5)
EOSINOPHIL NFR BLD: 0.4 % (ref 0–8)
ERYTHROCYTE [DISTWIDTH] IN BLOOD BY AUTOMATED COUNT: 15.3 % (ref 11.5–14.5)
EST. GFR  (NO RACE VARIABLE): >60 ML/MIN/1.73 M^2
GLUCOSE SERPL-MCNC: 108 MG/DL (ref 70–110)
GLUCOSE UR QL STRIP: NEGATIVE
HCT VFR BLD AUTO: 42 % (ref 40–54)
HGB BLD-MCNC: 14 G/DL (ref 14–18)
HGB UR QL STRIP: NEGATIVE
IMM GRANULOCYTES # BLD AUTO: 0.02 K/UL (ref 0–0.04)
IMM GRANULOCYTES NFR BLD AUTO: 0.4 % (ref 0–0.5)
KETONES UR QL STRIP: NEGATIVE
LEUKOCYTE ESTERASE UR QL STRIP: NEGATIVE
LYMPHOCYTES # BLD AUTO: 1.1 K/UL (ref 1–4.8)
LYMPHOCYTES NFR BLD: 23.2 % (ref 18–48)
MCH RBC QN AUTO: 28 PG (ref 27–31)
MCHC RBC AUTO-ENTMCNC: 33.3 G/DL (ref 32–36)
MCV RBC AUTO: 84 FL (ref 82–98)
MONOCYTES # BLD AUTO: 0.3 K/UL (ref 0.3–1)
MONOCYTES NFR BLD: 6.9 % (ref 4–15)
NEUTROPHILS # BLD AUTO: 3.3 K/UL (ref 1.8–7.7)
NEUTROPHILS NFR BLD: 68.9 % (ref 38–73)
NITRITE UR QL STRIP: NEGATIVE
NRBC BLD-RTO: 0 /100 WBC
PH UR STRIP: 7 [PH] (ref 5–8)
PLATELET # BLD AUTO: 382 K/UL (ref 150–450)
PMV BLD AUTO: 8.8 FL (ref 9.2–12.9)
POTASSIUM SERPL-SCNC: 3.9 MMOL/L (ref 3.5–5.1)
PROT SERPL-MCNC: 8.2 G/DL (ref 6–8.4)
PROT UR QL STRIP: NEGATIVE
RBC # BLD AUTO: 5 M/UL (ref 4.6–6.2)
SODIUM SERPL-SCNC: 141 MMOL/L (ref 136–145)
SP GR UR STRIP: 1.01 (ref 1–1.03)
URN SPEC COLLECT METH UR: ABNORMAL
UROBILINOGEN UR STRIP-ACNC: NEGATIVE EU/DL
WBC # BLD AUTO: 4.79 K/UL (ref 3.9–12.7)

## 2024-07-05 PROCEDURE — 85025 COMPLETE CBC W/AUTO DIFF WBC: CPT | Performed by: EMERGENCY MEDICINE

## 2024-07-05 PROCEDURE — 93010 ELECTROCARDIOGRAM REPORT: CPT | Mod: ,,, | Performed by: INTERNAL MEDICINE

## 2024-07-05 PROCEDURE — 80053 COMPREHEN METABOLIC PANEL: CPT | Performed by: EMERGENCY MEDICINE

## 2024-07-05 PROCEDURE — 81003 URINALYSIS AUTO W/O SCOPE: CPT | Performed by: EMERGENCY MEDICINE

## 2024-07-05 PROCEDURE — 99284 EMERGENCY DEPT VISIT MOD MDM: CPT | Mod: 25

## 2024-07-05 PROCEDURE — 25000003 PHARM REV CODE 250: Performed by: EMERGENCY MEDICINE

## 2024-07-05 PROCEDURE — 93005 ELECTROCARDIOGRAM TRACING: CPT

## 2024-07-05 RX ORDER — IBUPROFEN 600 MG/1
600 TABLET ORAL EVERY 6 HOURS PRN
Qty: 30 TABLET | Refills: 0 | Status: SHIPPED | OUTPATIENT
Start: 2024-07-05 | End: 2024-07-05

## 2024-07-05 RX ORDER — HYDROCODONE BITARTRATE AND ACETAMINOPHEN 5; 325 MG/1; MG/1
1 TABLET ORAL EVERY 6 HOURS PRN
Qty: 12 TABLET | Refills: 0 | Status: SHIPPED | OUTPATIENT
Start: 2024-07-05 | End: 2024-07-05

## 2024-07-05 RX ORDER — IBUPROFEN 600 MG/1
600 TABLET ORAL EVERY 6 HOURS PRN
Qty: 30 TABLET | Refills: 0 | Status: SHIPPED | OUTPATIENT
Start: 2024-07-05

## 2024-07-05 RX ORDER — HYDROCODONE BITARTRATE AND ACETAMINOPHEN 5; 325 MG/1; MG/1
1 TABLET ORAL EVERY 6 HOURS PRN
Qty: 12 TABLET | Refills: 0 | Status: SHIPPED | OUTPATIENT
Start: 2024-07-05

## 2024-07-05 RX ORDER — IBUPROFEN 600 MG/1
600 TABLET ORAL
Status: COMPLETED | OUTPATIENT
Start: 2024-07-05 | End: 2024-07-05

## 2024-07-05 RX ORDER — OXYCODONE AND ACETAMINOPHEN 5; 325 MG/1; MG/1
1 TABLET ORAL
Status: COMPLETED | OUTPATIENT
Start: 2024-07-05 | End: 2024-07-05

## 2024-07-05 RX ADMIN — OXYCODONE HYDROCHLORIDE AND ACETAMINOPHEN 1 TABLET: 5; 325 TABLET ORAL at 09:07

## 2024-07-05 RX ADMIN — IBUPROFEN 600 MG: 600 TABLET ORAL at 09:07

## 2024-07-05 NOTE — ED PROVIDER NOTES
Encounter Date: 7/5/2024    SCRIBE #1 NOTE: I, Demi Lopez, am scribing for, and in the presence of,  Camilo Zuñiga MD. I have scribed the following portions of the note - Other sections scribed: HPI, ROS.       History     Chief Complaint   Patient presents with    Post-op Problem     Pt reports to ED for pelvic pain following surgery for priapism on 6/26. Pt endorses burning with urination today. Pt endorses swelling to penis.      This 29 y.o male, with a medical history of Sickle Cell trait, and Urinary retention, presents to the ED c/o constant, severe (10/10) pain to the bilateral sides of the base of the penis for the last 1x week. Pt reports that he underwent surgery 6/26/24 (preformed by Dr. Smith) for priapism and has since been experiencing the pain. He states that he was prescribed pain medication, which he has been taking, without improvement. Pt notes that he was last seen by Dr. Smith yesterday for follow up evaluation. He reports that he began to experience a burning sensation with urination this morning. He also endorses slight dizziness. Pt denies penile discharge, abdominal pain, emesis, diarrhea, headache, ear pain, eye pain, sore throat, or cough. No other associated symptoms. No alleviating factors.    The history is provided by the patient.     Review of patient's allergies indicates:  No Known Allergies  Past Medical History:   Diagnosis Date    Urinary retention 06/27/2024     Past Surgical History:   Procedure Laterality Date    INCISION OF PENIS N/A 6/27/2024    Procedure: Incision, Penis, DIstal Penile Shunt;  Surgeon: Donald Smith MD;  Location: [facility];  Service: Urology;  Laterality: N/A;  11 blade, 50 cc syringes     No family history on file.     Review of Systems   Constitutional:  Negative for fever.   HENT:  Negative for ear pain and sore throat.    Eyes:  Negative for pain.   Respiratory:  Negative for cough and shortness of breath.    Cardiovascular:  Negative  for chest pain.   Gastrointestinal:  Negative for abdominal pain, diarrhea, nausea and vomiting.   Genitourinary:  Positive for dysuria and penile pain (to the bilateral sides of the base of the penis). Negative for penile discharge.   Musculoskeletal:  Negative for back pain.   Skin:  Negative for rash.   Neurological:  Positive for dizziness (slight). Negative for weakness and headaches.       Physical Exam     Initial Vitals [07/05/24 0739]   BP Pulse Resp Temp SpO2   (!) 180/97 (!) 122 18 98 °F (36.7 °C) 97 %      MAP       --         Physical Exam  The patient was examined specifically for the following:   General:No significant distress, Good color, Warm and dry. Head and neck:Scalp atraumatic, Neck supple. Neurological:Appropriate conversation, Gross motor deficits. Eyes:Conjugate gaze, Clear corneas. ENT: No epistaxis. Cardiac: Regular rate and rhythm, Grossly normal heart tones. Pulmonary: Wheezing, Rales. Gastrointestinal: Abdominal tenderness, Abdominal distention. Musculoskeletal: Extremity deformity, Apparent pain with range of motion of the joints. Skin: Rash.   The findings on examination were normal except for the following:  The patient is tachycardic with a heart rate of 122.  The lungs are clear and free of wheezing rales rubs or rhonchi.  The patient has a wound that is healing nicely very close to the urethral meatus.  The patient has some fullness and tenderness of the base of the penis bilaterally.  The scrotum testicles are nontender there is no swelling there.  There is no penile discharge.  The patient has no priapism at this time.   ED Course   Procedures  Labs Reviewed   CBC W/ AUTO DIFFERENTIAL - Abnormal; Notable for the following components:       Result Value    RDW 15.3 (*)     MPV 8.8 (*)     All other components within normal limits   URINALYSIS, REFLEX TO URINE CULTURE - Abnormal; Notable for the following components:    Color, UA Colorless (*)     All other components within  normal limits    Narrative:     Specimen Source->Urine   COMPREHENSIVE METABOLIC PANEL - Abnormal; Notable for the following components:    Alkaline Phosphatase 42 (*)     All other components within normal limits          Imaging Results    None          Medications   oxyCODONE-acetaminophen 5-325 mg per tablet 1 tablet (1 tablet Oral Given 7/5/24 0911)   ibuprofen tablet 600 mg (600 mg Oral Given 7/5/24 0910)     Medical Decision Making  Amount and/or Complexity of Data Reviewed  Labs: ordered.    Risk  Prescription drug management.    Given the above this patient presents emergency room with pain in the base of his penis.  There is some fullness there and minimal swelling.  The patient reports his pain has been unrelieved since he had his surgery more than a week ago.  The patient saw his urologist yesterday.  The patient also complained of some dizziness.  Diagnostic evaluation fails to reveal evidence of significant anemia electrolyte abnormalities hepatic or renal failure.  The patient has a normal hemoglobin and hematocrit which is interesting because he is a sickle cell patient.  The patient's urine is clean.  He had some mild dysuria I doubt urinary tract infection.  I will refer him to Urology Clinic.  I will treat for pain.  I do not see any evidence of infection testicular torsion or other significant abnormalities.  The wound on the corona of the penis appears to be healing nicely.  The patient's heart rate is 91 at discharge.  The patient is in a sinus rhythm.        Scribe Attestation:   Scribe #1: I performed the above scribed service and the documentation accurately describes the services I performed. I attest to the accuracy of the note.                               Clinical Impression:  Final diagnoses:  [R00.0] Sinus tachycardia  [N48.89] Penile pain - Postoperative (Primary)  [Z87.438] History of priapism  [R42] Dizziness          ED Disposition Condition    Discharge Stable          ED  Prescriptions       Medication Sig Dispense Start Date End Date Auth. Provider    HYDROcodone-acetaminophen (NORCO) 5-325 mg per tablet Take 1 tablet by mouth every 6 (six) hours as needed. 12 tablet 7/5/2024 -- Camilo Zuñiga MD    ibuprofen (ADVIL,MOTRIN) 600 MG tablet Take 1 tablet (600 mg total) by mouth every 6 (six) hours as needed for Pain. 30 tablet 7/5/2024 -- Camilo Zuñiga MD          Follow-up Information       Follow up With Specialties Details Why Contact Info    Donald Smith MD Urology In 3 days Call today for an appointment [address]   [facility] [address]  [phone]               Camilo Zuñiga MD  07/05/24 0824

## 2024-07-05 NOTE — DISCHARGE INSTRUCTIONS
Please follow-up with your urologist in the office next week.  Please return immediately if you get worse or if new problems develop.  Call today for an appointment.  Medicines as directed.  Rest

## 2024-07-06 LAB
OHS QRS DURATION: 92 MS
OHS QTC CALCULATION: 398 MS

## 2024-07-27 ENCOUNTER — HOSPITAL ENCOUNTER (EMERGENCY)
Facility: HOSPITAL | Age: 29
Discharge: HOME | End: 2024-07-27
Attending: EMERGENCY MEDICINE
Payer: MEDICAID

## 2024-07-27 VITALS
HEIGHT: 71 IN | BODY MASS INDEX: 30.1 KG/M2 | WEIGHT: 215 LBS | RESPIRATION RATE: 21 BRPM | HEART RATE: 84 BPM | DIASTOLIC BLOOD PRESSURE: 92 MMHG | TEMPERATURE: 99 F | SYSTOLIC BLOOD PRESSURE: 143 MMHG | OXYGEN SATURATION: 99 %

## 2024-07-27 DIAGNOSIS — M62.82 NON-TRAUMATIC RHABDOMYOLYSIS: Primary | ICD-10-CM

## 2024-07-27 DIAGNOSIS — M62.838 MUSCLE SPASM: ICD-10-CM

## 2024-07-27 DIAGNOSIS — R00.0 TACHYCARDIA: ICD-10-CM

## 2024-07-27 LAB
ALBUMIN SERPL BCP-MCNC: 4.5 G/DL (ref 3.5–5.2)
ALP SERPL-CCNC: 48 U/L (ref 55–135)
ALT SERPL W/O P-5'-P-CCNC: 15 U/L (ref 10–44)
ANION GAP SERPL CALC-SCNC: 10 MMOL/L (ref 8–16)
AST SERPL-CCNC: 18 U/L (ref 10–40)
BASOPHILS # BLD AUTO: 0.03 K/UL (ref 0–0.2)
BASOPHILS NFR BLD: 0.7 % (ref 0–1.9)
BILIRUB SERPL-MCNC: 0.3 MG/DL (ref 0.1–1)
BILIRUB UR QL STRIP: NEGATIVE
BUN SERPL-MCNC: 11 MG/DL (ref 6–20)
CALCIUM SERPL-MCNC: 10 MG/DL (ref 8.7–10.5)
CHLORIDE SERPL-SCNC: 106 MMOL/L (ref 95–110)
CK SERPL-CCNC: 244 U/L (ref 20–200)
CLARITY UR: CLEAR
CO2 SERPL-SCNC: 25 MMOL/L (ref 23–29)
COLOR UR: YELLOW
CREAT SERPL-MCNC: 1.2 MG/DL (ref 0.5–1.4)
DIFFERENTIAL METHOD BLD: ABNORMAL
EOSINOPHIL # BLD AUTO: 0.1 K/UL (ref 0–0.5)
EOSINOPHIL NFR BLD: 1.6 % (ref 0–8)
ERYTHROCYTE [DISTWIDTH] IN BLOOD BY AUTOMATED COUNT: 14.3 % (ref 11.5–14.5)
EST. GFR  (NO RACE VARIABLE): >60 ML/MIN/1.73 M^2
GLUCOSE SERPL-MCNC: 114 MG/DL (ref 70–110)
GLUCOSE UR QL STRIP: NEGATIVE
HCT VFR BLD AUTO: 46.8 % (ref 40–54)
HGB BLD-MCNC: 15.8 G/DL (ref 14–18)
HGB UR QL STRIP: NEGATIVE
IMM GRANULOCYTES # BLD AUTO: 0 K/UL (ref 0–0.04)
IMM GRANULOCYTES NFR BLD AUTO: 0 % (ref 0–0.5)
KETONES UR QL STRIP: NEGATIVE
LEUKOCYTE ESTERASE UR QL STRIP: NEGATIVE
LYMPHOCYTES # BLD AUTO: 1.9 K/UL (ref 1–4.8)
LYMPHOCYTES NFR BLD: 42.2 % (ref 18–48)
MAGNESIUM SERPL-MCNC: 2.1 MG/DL (ref 1.6–2.6)
MCH RBC QN AUTO: 28.1 PG (ref 27–31)
MCHC RBC AUTO-ENTMCNC: 33.8 G/DL (ref 32–36)
MCV RBC AUTO: 83 FL (ref 82–98)
MONOCYTES # BLD AUTO: 0.3 K/UL (ref 0.3–1)
MONOCYTES NFR BLD: 7 % (ref 4–15)
NEUTROPHILS # BLD AUTO: 2.1 K/UL (ref 1.8–7.7)
NEUTROPHILS NFR BLD: 48.5 % (ref 38–73)
NITRITE UR QL STRIP: NEGATIVE
NRBC BLD-RTO: 0 /100 WBC
PH UR STRIP: 6 [PH] (ref 5–8)
PLATELET # BLD AUTO: 345 K/UL (ref 150–450)
PMV BLD AUTO: 9 FL (ref 9.2–12.9)
POTASSIUM SERPL-SCNC: 3.8 MMOL/L (ref 3.5–5.1)
PROT SERPL-MCNC: 8.3 G/DL (ref 6–8.4)
PROT UR QL STRIP: NEGATIVE
RBC # BLD AUTO: 5.62 M/UL (ref 4.6–6.2)
SODIUM SERPL-SCNC: 141 MMOL/L (ref 136–145)
SP GR UR STRIP: 1.01 (ref 1–1.03)
URN SPEC COLLECT METH UR: NORMAL
UROBILINOGEN UR STRIP-ACNC: NEGATIVE EU/DL
WBC # BLD AUTO: 4.41 K/UL (ref 3.9–12.7)

## 2024-07-27 PROCEDURE — 96375 TX/PRO/DX INJ NEW DRUG ADDON: CPT

## 2024-07-27 PROCEDURE — 83735 ASSAY OF MAGNESIUM: CPT | Performed by: EMERGENCY MEDICINE

## 2024-07-27 PROCEDURE — 96361 HYDRATE IV INFUSION ADD-ON: CPT

## 2024-07-27 PROCEDURE — 80053 COMPREHEN METABOLIC PANEL: CPT | Performed by: EMERGENCY MEDICINE

## 2024-07-27 PROCEDURE — 85025 COMPLETE CBC W/AUTO DIFF WBC: CPT | Performed by: EMERGENCY MEDICINE

## 2024-07-27 PROCEDURE — 81003 URINALYSIS AUTO W/O SCOPE: CPT | Performed by: EMERGENCY MEDICINE

## 2024-07-27 PROCEDURE — 63600175 PHARM REV CODE 636 W HCPCS: Performed by: EMERGENCY MEDICINE

## 2024-07-27 PROCEDURE — 93005 ELECTROCARDIOGRAM TRACING: CPT

## 2024-07-27 PROCEDURE — 25000003 PHARM REV CODE 250: Performed by: EMERGENCY MEDICINE

## 2024-07-27 PROCEDURE — 99284 EMERGENCY DEPT VISIT MOD MDM: CPT | Mod: 25

## 2024-07-27 PROCEDURE — 82550 ASSAY OF CK (CPK): CPT | Performed by: EMERGENCY MEDICINE

## 2024-07-27 PROCEDURE — 93010 ELECTROCARDIOGRAM REPORT: CPT | Mod: ,,, | Performed by: INTERNAL MEDICINE

## 2024-07-27 PROCEDURE — 96374 THER/PROPH/DIAG INJ IV PUSH: CPT

## 2024-07-27 RX ORDER — CYCLOBENZAPRINE HCL 10 MG
10 TABLET ORAL 3 TIMES DAILY PRN
Qty: 15 TABLET | Refills: 0 | Status: SHIPPED | OUTPATIENT
Start: 2024-07-27 | End: 2024-08-01

## 2024-07-27 RX ORDER — LORAZEPAM 0.5 MG/1
1 TABLET ORAL
Status: COMPLETED | OUTPATIENT
Start: 2024-07-27 | End: 2024-07-27

## 2024-07-27 RX ORDER — IBUPROFEN 600 MG/1
600 TABLET ORAL EVERY 6 HOURS PRN
Qty: 20 TABLET | Refills: 0 | Status: SHIPPED | OUTPATIENT
Start: 2024-07-27

## 2024-07-27 RX ORDER — MORPHINE SULFATE 4 MG/ML
6 INJECTION, SOLUTION INTRAMUSCULAR; INTRAVENOUS
Status: COMPLETED | OUTPATIENT
Start: 2024-07-27 | End: 2024-07-27

## 2024-07-27 RX ORDER — ACETAMINOPHEN 500 MG
500 TABLET ORAL EVERY 6 HOURS PRN
Qty: 13 TABLET | Refills: 0 | Status: SHIPPED | OUTPATIENT
Start: 2024-07-27

## 2024-07-27 RX ORDER — CYCLOBENZAPRINE HCL 5 MG
10 TABLET ORAL
Status: COMPLETED | OUTPATIENT
Start: 2024-07-27 | End: 2024-07-27

## 2024-07-27 RX ORDER — KETOROLAC TROMETHAMINE 30 MG/ML
15 INJECTION, SOLUTION INTRAMUSCULAR; INTRAVENOUS
Status: COMPLETED | OUTPATIENT
Start: 2024-07-27 | End: 2024-07-27

## 2024-07-27 RX ADMIN — SODIUM CHLORIDE, POTASSIUM CHLORIDE, SODIUM LACTATE AND CALCIUM CHLORIDE 1000 ML: 600; 310; 30; 20 INJECTION, SOLUTION INTRAVENOUS at 07:07

## 2024-07-27 RX ADMIN — CYCLOBENZAPRINE HYDROCHLORIDE 10 MG: 5 TABLET, FILM COATED ORAL at 07:07

## 2024-07-27 RX ADMIN — KETOROLAC TROMETHAMINE 15 MG: 30 INJECTION, SOLUTION INTRAMUSCULAR at 07:07

## 2024-07-27 RX ADMIN — LORAZEPAM 1 MG: 0.5 TABLET ORAL at 09:07

## 2024-07-27 RX ADMIN — MORPHINE SULFATE 6 MG: 4 INJECTION, SOLUTION INTRAMUSCULAR; INTRAVENOUS at 10:07
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
## 2024-07-27 NOTE — ED TRIAGE NOTES
Daniele Durán Doug, a 29 y.o. male presents to the ED via PV with CC of Bilateral leg pain. Pt reports having a penile surgery last than a month ago. Pt ambulatory to room

## 2024-07-27 NOTE — Clinical Note
"Daniele"Ciara Camacho was seen and treated in our emergency department on 7/27/2024.  He may return to work on 07/29/2024.       If you have any questions or concerns, please don't hesitate to call.      Ernesto Castro MD"

## 2024-07-27 NOTE — DISCHARGE INSTRUCTIONS

## 2024-07-28 LAB
OHS QRS DURATION: 90 MS
OHS QTC CALCULATION: 399 MS

## 2024-08-05 ENCOUNTER — PATIENT OUTREACH (OUTPATIENT)
Dept: ADMINISTRATIVE | Facility: OTHER | Age: 29
End: 2024-08-05
Payer: MEDICAID

## 2024-10-15 ENCOUNTER — HOSPITAL ENCOUNTER (EMERGENCY)
Facility: HOSPITAL | Age: 29
Discharge: HOME | End: 2024-10-15
Attending: INTERNAL MEDICINE

## 2024-10-15 VITALS
RESPIRATION RATE: 20 BRPM | SYSTOLIC BLOOD PRESSURE: 152 MMHG | BODY MASS INDEX: 28.44 KG/M2 | HEART RATE: 96 BPM | HEIGHT: 72 IN | WEIGHT: 210 LBS | OXYGEN SATURATION: 98 % | DIASTOLIC BLOOD PRESSURE: 74 MMHG | TEMPERATURE: 99 F

## 2024-10-15 DIAGNOSIS — S61.211A LACERATION OF LEFT INDEX FINGER WITHOUT FOREIGN BODY WITHOUT DAMAGE TO NAIL, INITIAL ENCOUNTER: Primary | ICD-10-CM

## 2024-10-15 PROCEDURE — 90471 IMMUNIZATION ADMIN: CPT | Mod: ER | Performed by: INTERNAL MEDICINE

## 2024-10-15 PROCEDURE — 63600175 PHARM REV CODE 636 W HCPCS: Mod: ER | Performed by: INTERNAL MEDICINE

## 2024-10-15 PROCEDURE — 25000003 PHARM REV CODE 250: Mod: ER | Performed by: INTERNAL MEDICINE

## 2024-10-15 PROCEDURE — 99284 EMERGENCY DEPT VISIT MOD MDM: CPT | Mod: 25,ER

## 2024-10-15 PROCEDURE — 90714 TD VACC NO PRESV 7 YRS+ IM: CPT | Mod: ER | Performed by: INTERNAL MEDICINE

## 2024-10-15 RX ORDER — LIDOCAINE HYDROCHLORIDE 10 MG/ML
INJECTION, SOLUTION INFILTRATION; PERINEURAL
Status: DISCONTINUED
Start: 2024-10-15 | End: 2024-10-15 | Stop reason: WASHOUT

## 2024-10-15 RX ORDER — BACITRACIN ZINC 500 UNIT/G
1 OINTMENT (GRAM) TOPICAL
Status: COMPLETED | OUTPATIENT
Start: 2024-10-15 | End: 2024-10-15

## 2024-10-15 RX ADMIN — CLOSTRIDIUM TETANI TOXOID ANTIGEN (FORMALDEHYDE INACTIVATED) AND CORYNEBACTERIUM DIPHTHERIAE TOXOID ANTIGEN (FORMALDEHYDE INACTIVATED) 0.5 ML: 5; 2 INJECTION, SUSPENSION INTRAMUSCULAR at 11:10

## 2024-10-15 RX ADMIN — BACITRACIN 1 TUBE: 500 OINTMENT TOPICAL at 11:10

## 2024-10-16 NOTE — ED PROVIDER NOTES
Encounter Date: 10/15/2024    SCRIBE #1 NOTE: I, Kareem Bruno Do, am scribing for, and in the presence of,  Al Sánchez MD. I have scribed the following portions of the note - Other sections scribed: HPI, ROS, PE.       History     Chief Complaint   Patient presents with    Laceration     PT WITH 2 LACS TO LEFT INDEX FINGER AFTER CUTTING IT WITH KNIFE AT APPROX 1100 TODAY, REPORTS ACTIVE BLEEDING ALL DAY, BLEEDING AT TRIAGE, QUICK CLOT PLACED WITH PRESSURE DRESSING APPLIED     Daniele Camacho is a 29 y.o. male, with a pertinent PMHx of HTN, who presents to the ED with a laceration to his left index finger onset this morning. Patient notes handling a kitchen knife when he cut himself. He notes bleeding from the wound for about 1 hour, notes previous bleeding was controlled following the incident. No other exacerbating or alleviating factors. Patient denies nausea, SOB, headache, or other associated symptoms. Patient reports his last tetanus vaccination was about 15 years ago.     The history is provided by the patient. No  was used.     Review of patient's allergies indicates:  No Known Allergies  Past Medical History:   Diagnosis Date    Hypertension     Urinary retention 06/27/2024     Past Surgical History:   Procedure Laterality Date    INCISION OF PENIS N/A 6/27/2024    Procedure: Incision, Penis, DIstal Penile Shunt;  Surgeon: Donald Smith MD;  Location: [facility];  Service: Urology;  Laterality: N/A;  11 blade, 50 cc syringes     No family history on file.  Social History     Tobacco Use    Smoking status: Never    Smokeless tobacco: Never   Substance Use Topics    Alcohol use: Yes     Comment: OCC    Drug use: Not Currently     Review of Systems   Constitutional:  Negative for fever.   HENT:  Negative for sore throat.    Respiratory:  Negative for shortness of breath.    Cardiovascular:  Negative for chest pain.   Gastrointestinal:  Negative for diarrhea, nausea and vomiting.    Genitourinary:  Negative for dysuria.   Musculoskeletal:  Negative for back pain.   Skin:  Positive for wound. Negative for rash.   Neurological:  Negative for weakness and headaches.   Psychiatric/Behavioral:  Negative for behavioral problems.    All other systems reviewed and are negative.      Physical Exam     Initial Vitals [10/15/24 2158]   BP Pulse Resp Temp SpO2   (!) 152/74 96 20 98.9 °F (37.2 °C) 98 %      MAP       --         Physical Exam    Nursing note and vitals reviewed.  Constitutional: He appears well-developed and well-nourished.   HENT:   Head: Normocephalic and atraumatic.   Eyes: Conjunctivae are normal.   Neck: Neck supple.   Normal range of motion.  Cardiovascular:  Normal rate, regular rhythm and normal heart sounds.     Exam reveals no gallop and no friction rub.       No murmur heard.  Pulmonary/Chest: Breath sounds normal. No respiratory distress. He has no wheezes. He has no rhonchi. He has no rales.   Abdominal: Abdomen is soft. There is no abdominal tenderness.   Musculoskeletal:         General: No edema. Normal range of motion.      Cervical back: Normal range of motion and neck supple.     Neurological: He is alert and oriented to person, place, and time. GCS score is 15. GCS eye subscore is 4. GCS verbal subscore is 5. GCS motor subscore is 6.   Skin: Skin is warm and dry.   Left index finger with an evulsion injury that is slightly oozing blood. Bilateral upper extremities are neurovascularly intact.    Psychiatric: He has a normal mood and affect.         ED Course   Procedures  Labs Reviewed - No data to display       Imaging Results    None          Medications   Td (Tenivac) IM vaccine (>/= 6 yo) (0.5 mLs Intramuscular Given 10/15/24 2312)   bacitracin ointment 1 Tube (1 Tube Topical (Top) Given 10/15/24 2312)     Medical Decision Making  Daniele Camacho is a 29 y.o. male, with a pertinent PMHx of HTN, who presents to the ED with a laceration to his left index finger  onset this morning. Patient notes handling a kitchen knife when he cut himself. He notes bleeding from the wound for about 1 hour, notes previous bleeding was controlled following the incident. No other exacerbating or alleviating factors. Patient denies nausea, SOB, headache, or other associated symptoms. Patient reports his last tetanus vaccination was about 15 years ago.   Course of ED stay:   Patient's wound was cleaned and dressed in the emergency department.  Tetanus booster was given and patient was advised to follow-up with his primary care physician within the next week for re-evaluation/return to the emergency department if condition worsens.    Risk  OTC drugs.  Prescription drug management.            Scribe Attestation:   Scribe #1: I performed the above scribed service and the documentation accurately describes the services I performed. I attest to the accuracy of the note.                               Clinical Impression:  Final diagnoses:  [Y07.983A] Laceration of left index finger without foreign body without damage to nail, initial encounter (Primary)       This document was produced by a scribe under my direction and in my presence. I agree with the content of the note and have made any necessary edits.     Dr. Sánchez    10/16/2024 3:53 AM     ED Disposition Condition    Discharge Stable          ED Prescriptions    None       Follow-up Information       Follow up With Specialties Details Why Contact Info    Jose Yeung, FNP-C Family Medicine Schedule an appointment as soon as possible for a visit in 2 days For reevaluation [id number] [facility]  [facility] [address]  [phone]               Al Sánchez MD  10/16/24 0350

## 2024-10-21 ENCOUNTER — HOSPITAL ENCOUNTER (EMERGENCY)
Facility: HOSPITAL | Age: 29
Discharge: HOME | End: 2024-10-21
Attending: EMERGENCY MEDICINE

## 2024-10-21 VITALS
HEART RATE: 70 BPM | OXYGEN SATURATION: 100 % | TEMPERATURE: 98 F | SYSTOLIC BLOOD PRESSURE: 145 MMHG | WEIGHT: 210 LBS | HEIGHT: 72 IN | BODY MASS INDEX: 28.44 KG/M2 | DIASTOLIC BLOOD PRESSURE: 85 MMHG | RESPIRATION RATE: 17 BRPM

## 2024-10-21 DIAGNOSIS — Z76.0 ENCOUNTER FOR MEDICATION REFILL: Primary | ICD-10-CM

## 2024-10-21 PROCEDURE — 99281 EMR DPT VST MAYX REQ PHY/QHP: CPT | Mod: ER

## 2024-10-21 RX ORDER — BUPRENORPHINE AND NALOXONE 8; 2 MG/1; MG/1
1 FILM, SOLUBLE BUCCAL; SUBLINGUAL 3 TIMES DAILY
Qty: 15 FILM | Refills: 0 | Status: SHIPPED | OUTPATIENT
Start: 2024-10-21 | End: 2024-10-26

## 2024-10-21 NOTE — ED PROVIDER NOTES
"Encounter Date: 10/21/2024       History     Chief Complaint   Patient presents with    Medication Refill     Requesting refill on suboxone due to "insurance issues not letting me refill it." Last dose approx 7 days ago.     Withdrawal     "I think I am starting to withdraw since not having my suboxone."     29 y.o. male with HTN presents to ED requesting Suboxone 8 mg TID refill until his insurance is reinstated this coming Friday. Patient states he has been out of this medication for a little over a week ago due to a lapse in his insurance coverage.  He expresses concern that he may be withdrawing from Suboxone due to reported HA, fatigue, difficulty sleeping, anxiety, and body aches over the last few days.  He denies fever, sore throat, CP, SOB, NVD, abdominal pain, vision disturbance, photophobia, dizziness, syncope, depression, SI/HI, hallucinations or other acute complaint.      The history is provided by the patient.     Review of patient's allergies indicates:  No Known Allergies  Past Medical History:   Diagnosis Date    Hypertension     Urinary retention 06/27/2024     Past Surgical History:   Procedure Laterality Date    INCISION OF PENIS N/A 6/27/2024    Procedure: Incision, Penis, DIstal Penile Shunt;  Surgeon: Donald Smith MD;  Location: [facility] OR;  Service: Urology;  Laterality: N/A;  11 blade, 50 cc syringes     No family history on file.  Social History     Tobacco Use    Smoking status: Never    Smokeless tobacco: Never   Substance Use Topics    Alcohol use: Yes     Comment: OCC    Drug use: Not Currently     Review of Systems   Constitutional:  Positive for fatigue. Negative for appetite change and fever.   HENT:  Positive for congestion and rhinorrhea. Negative for sore throat, trouble swallowing and voice change.    Eyes:  Negative for photophobia and visual disturbance.   Respiratory:  Negative for cough and shortness of breath.    Cardiovascular:  Negative for chest pain. "   Gastrointestinal:  Negative for abdominal pain, diarrhea, nausea and vomiting.   Musculoskeletal:  Positive for myalgias. Negative for arthralgias, back pain, gait problem, joint swelling and neck stiffness.   Skin:  Negative for rash and wound.   Neurological:  Positive for headaches. Negative for dizziness, syncope and weakness.   Psychiatric/Behavioral:  Positive for decreased concentration and sleep disturbance. Negative for hallucinations and suicidal ideas. The patient is nervous/anxious.        Physical Exam     Initial Vitals [10/21/24 0024]   BP Pulse Resp Temp SpO2   (!) 142/88 73 14 98.3 °F (36.8 °C) 99 %      MAP       --         Physical Exam    Nursing note and vitals reviewed.  Constitutional: He appears well-developed and well-nourished. He is not diaphoretic. No distress.   HENT:   Head: Normocephalic and atraumatic. Mouth/Throat: Oropharynx is clear and moist.   Eyes: Conjunctivae are normal.   Neck: Phonation normal. No stridor present.   Normal range of motion.  Cardiovascular:  Regular rhythm and intact distal pulses.           Pulmonary/Chest: Effort normal. No accessory muscle usage or stridor. No tachypnea. No respiratory distress.   Abdominal: He exhibits no distension. There is no abdominal tenderness.   Musculoskeletal:         General: No tenderness. Normal range of motion.      Cervical back: Normal range of motion.     Neurological: He is alert and oriented to person, place, and time. He has normal strength. Gait normal. GCS eye subscore is 4. GCS verbal subscore is 5. GCS motor subscore is 6.   Skin: Skin is warm and intact.   Psychiatric: He has a normal mood and affect. His speech is normal. He is not actively hallucinating. Thought content is not paranoid and not delusional. He expresses no homicidal and no suicidal ideation. He expresses no suicidal plans and no homicidal plans. He is attentive.         ED Course   Procedures  Labs Reviewed - No data to display       Imaging  Results    None          Medications - No data to display  Medical Decision Making                                    Clinical Impression:  Final diagnoses:  [Z76.0] Encounter for medication refill (Primary)          ED Disposition Condition    Discharge Stable          ED Prescriptions       Medication Sig Dispense Start Date End Date Auth. Provider    buprenorphine-naloxone 8-2 mg (SUBOXONE) 8-2 mg Place 1 Film under the tongue 3 (three) times daily. for 5 days 15 Film 10/21/2024 10/26/2024 Margarita Hays MD          Follow-up Information       Follow up With Specialties Details Why Contact Info    The nearest emergency department.  Go to  As needed, If symptoms worsen     Your PCP  Go on 10/25/2024 as previously scheduled              Margarita Hays MD  10/24/24 0321

## 2024-11-21 ENCOUNTER — HOSPITAL ENCOUNTER (EMERGENCY)
Facility: HOSPITAL | Age: 29
Discharge: HOME | End: 2024-11-21
Attending: EMERGENCY MEDICINE
Payer: MEDICAID

## 2024-11-21 VITALS
HEART RATE: 64 BPM | RESPIRATION RATE: 18 BRPM | BODY MASS INDEX: 28.44 KG/M2 | TEMPERATURE: 98 F | OXYGEN SATURATION: 98 % | DIASTOLIC BLOOD PRESSURE: 82 MMHG | SYSTOLIC BLOOD PRESSURE: 136 MMHG | WEIGHT: 210 LBS | HEIGHT: 72 IN

## 2024-11-21 DIAGNOSIS — G47.00 INSOMNIA, UNSPECIFIED TYPE: Primary | ICD-10-CM

## 2024-11-21 PROCEDURE — 25000003 PHARM REV CODE 250

## 2024-11-21 PROCEDURE — 99283 EMERGENCY DEPT VISIT LOW MDM: CPT

## 2024-11-21 RX ORDER — ZOLPIDEM TARTRATE 5 MG/1
10 TABLET ORAL
Status: COMPLETED | OUTPATIENT
Start: 2024-11-21 | End: 2024-11-21

## 2024-11-21 RX ADMIN — ZOLPIDEM TARTRATE 10 MG: 5 TABLET ORAL at 12:11

## 2024-11-21 NOTE — DISCHARGE INSTRUCTIONS
Thank you for coming to our Emergency Department today. It is important to remember that some problems or medical conditions are difficult to diagnose and may not be found or addressed during your Emergency Department visit.  These conditions often start with non-specific symptoms and can only be diagnosed on follow up visits with your primary care physician or specialist when the symptoms continue or change. Please remember that all medical conditions can change, and we cannot predict how you will be feeling tomorrow or the next day. Return to the ER with any questions/concerns, new/concerning symptoms, worsening or failure to improve.       Be sure to follow up with your primary care doctor and review all labs/imaging/tests that were performed during your ER visit with them. It is very common for us to identify non-emergent incidental findings which must be followed up with your primary care physician.  Some labs/imaging/tests may be outside of the normal range, and require non-emergent follow-up and/or further investigation/treatment/procedures/testing to help diagnose/exclude/prevent complications or other potentially serious medical conditions. Some abnormalities may not have been discussed or addressed during your ER visit.     An ER visit does not replace a primary care visit, and many screening tests or follow-up tests cannot be ordered by an ER doctor or performed by the ER. Some tests may even require pre-approval.    If you do not have a primary care doctor, you may contact the one listed on your discharge paperwork or you may also call the [facility] Appointment Desk at [phone] , or [address] [place] [address] at  [phone] to schedule an appointment, or establish care with a primary care doctor or even a specialist and to obtain information about local resources. It is important to your health that you have a primary care doctor.    Please take all medications as directed. We have done our best to select  a medication for you that will treat your condition however, all medications may potentially have side-effects and it is impossible to predict which medications may give you side-effects or what those side-effects (if any) those medications may give you.  If you feel that you are having a negative effect or side-effect of any medication you should stop taking those medications immediately and seek medical attention. If you feel that you are having a life-threatening reaction call 911.        Do not drive, swim, climb to height, take a bath, operate heavy machinery, drink alcohol or take potentially sedating medications, sign any legal documents or make any important decisions for 24 hours if you have received any pain medications, sedatives or mood altering drugs during your ER visit or within 24 hours of taking them if they have been prescribed to you.     You can find additional resources for Dentists, hearing aids, durable medical equipment, low cost pharmacies and other resources at [email or web address]

## 2024-11-21 NOTE — ED PROVIDER NOTES
Encounter Date: 11/21/2024       History     Chief Complaint   Patient presents with    Insomnia     Pt arrived via ems, pt chief complaint is Insomnia. Pt states has is out of his Ambien and has not sleep for past 4 days.       Patient is a 29-year-old male with a past medical history of hypertension, anxiety who presents to the emergency department with complaints of insomnia.  Patient states that he typically takes Ambien to sleep at night.  This is prescribed by his primary care doctor.  Patient states that he ran out of his Ambien approximately 3 days ago and has not been sleeping since.  Does report a recent death in his family which has exacerbated the issues.  Patient does have a refill for this prescription but is not able to fill for another week.  He denies suicidal ideations, homicidal ideations, nausea, vomiting, fever, chills.        Review of patient's allergies indicates:  No Known Allergies  Past Medical History:   Diagnosis Date    Hypertension     Urinary retention 06/27/2024     Past Surgical History:   Procedure Laterality Date    INCISION OF PENIS N/A 6/27/2024    Procedure: Incision, Penis, DIstal Penile Shunt;  Surgeon: Donald Smith MD;  Location: [facility];  Service: Urology;  Laterality: N/A;  11 blade, 50 cc syringes     No family history on file.  Social History     Tobacco Use    Smoking status: Never    Smokeless tobacco: Never   Substance Use Topics    Alcohol use: Yes     Comment: OCC    Drug use: Not Currently     Review of Systems   Constitutional:  Negative for chills and fever.   Respiratory:  Negative for chest tightness and shortness of breath.    Cardiovascular:  Negative for chest pain and leg swelling.   Gastrointestinal:  Negative for abdominal pain and nausea.   Neurological:  Negative for dizziness and weakness.   Psychiatric/Behavioral:  Positive for sleep disturbance. Negative for hallucinations and self-injury. The patient is nervous/anxious.        Physical Exam      Initial Vitals [11/21/24 1022]   BP Pulse Resp Temp SpO2   (!) 150/74 75 16 98.5 °F (36.9 °C) 98 %      MAP       --         Physical Exam    Nursing note and vitals reviewed.  Constitutional: He appears well-developed and well-nourished. He is not diaphoretic. He does not appear ill. No distress.   HENT:   Head: Normocephalic and atraumatic.   Right Ear: External ear normal.   Left Ear: External ear normal.   Nose: Nose normal. Mouth/Throat: Uvula is midline and oropharynx is clear and moist.   Eyes: Conjunctivae and EOM are normal. Pupils are equal, round, and reactive to light. Right eye exhibits no discharge. Left eye exhibits no discharge. No scleral icterus.   Neck: Trachea normal.   Normal range of motion.   Full passive range of motion without pain.     Cardiovascular:  Normal rate and normal pulses.     Exam reveals no distant heart sounds.       Pulmonary/Chest: Effort normal. No respiratory distress.   Abdominal: Abdomen is soft. Bowel sounds are normal. He exhibits no pulsatile midline mass.   No right CVA tenderness.  No left CVA tenderness.   Musculoskeletal:         General: Normal range of motion.      Cervical back: Full passive range of motion without pain and normal range of motion.     Neurological: He is alert and oriented to person, place, and time. He has normal strength. No cranial nerve deficit or sensory deficit. Coordination and gait normal.   Skin: Skin is warm and dry. Capillary refill takes less than 2 seconds. No bruising, no ecchymosis and no rash noted. No erythema.   Psychiatric: He has a normal mood and affect. His speech is normal and behavior is normal. Thought content normal.         ED Course   Procedures  Labs Reviewed - No data to display       Imaging Results    None          Medications   zolpidem tablet 10 mg (10 mg Oral Given 11/21/24 1255)     Medical Decision Making  Patient is a 29-year-old male with a past medical history of hypertension, anxiety who presents to  the emergency department with complaints of insomnia.  Patient states that he typically takes Ambien to sleep at night.  This is prescribed by his primary care doctor.  Patient states that he ran out of his Ambien approximately 3 days ago and has not been sleeping since.  Does report a recent death in his family which has exacerbated the issues.  Patient does have a refill for this prescription but is not able to fill for another week.  He denies suicidal ideations, homicidal ideations, nausea, vomiting, fever, chills.    Differentials include but are not limited to anxiety, insomnia, drug use, stress reaction.    Patient is physical exam and history most consistent with insomnia.  Discussed with patient that we will not be able to fill the Ambien due to him having a refill already at the pharmacy.  Discussed with patient that he would need to follow up with his primary care, which he states he has an appointment with her next week.  Gave the patient a dose of Ambien here in the emergency department.  Patient does have a right home.  Patient denies any additional complaints at this time, and continues to deny suicidal or homicidal ideations. I discussed with the patient the diagnosis, treatment plan, indications for return to the emergency department, and for expected follow-up. The patient verbalized an understanding. The patient is asked if there are any questions or concerns. We discuss the case, until all issues are addressed to the patient's satisfaction. Patient understands and is agreeable to the plan.     Risk  Prescription drug management.                                      Clinical Impression:  Final diagnoses:  [G47.00] Insomnia, unspecified type (Primary)          ED Disposition Condition    Discharge Stable          ED Prescriptions    None       Follow-up Information       Follow up With Specialties Details Why Contact Info    Jose Yeung, FNP-C Family Medicine   [id number]  FAMILY MEDICINE  [facility] [address]  [phone]      [facility] Emergency Dept Emergency Medicine Go to  for new or worsening symptoms [address] [facility] [place] [address] [phone]             Latoya Hays PA-C  11/21/24 4096

## 2024-11-24 ENCOUNTER — NURSE TRIAGE (OUTPATIENT)
Dept: ADMINISTRATIVE | Facility: CLINIC | Age: 29
End: 2024-11-24
Payer: MEDICAID

## 2024-11-24 NOTE — TELEPHONE ENCOUNTER
States took hydrocodone last night and tramadol this AM without any relief. Reports hx of RA but not documented in EMR.   Pain everywhere, but mostly bilat legs. Pain currently 9/10.  Care advice provided per protocol, with recommendation to be seen within 4 hrs of call. Pt CHINMAY.   Reason for Disposition   [1] SEVERE pain (e.g., excruciating, unable to do any normal activities) AND [2] not improved after 2 hours of pain medicine    Additional Information   Negative: Looks like a broken bone or dislocated joint (e.g., crooked or deformed)   Negative: Sounds like a life-threatening emergency to the triager   Negative: Chest pain   Negative: Difficulty breathing   Negative: Entire foot is cool or blue in comparison to other side   Negative: Unable to walk   Negative: [1] Red area or streak AND [2] fever   Negative: [1] Swollen joint AND [2] fever   Negative: [1] Cast on leg or ankle AND [2] now increased pain   Negative: Patient sounds very sick or weak to the triager    Protocols used: Leg Pain-A-AH

## 2025-01-02 ENCOUNTER — NURSE TRIAGE (OUTPATIENT)
Dept: ADMINISTRATIVE | Facility: CLINIC | Age: 30
End: 2025-01-02
Payer: MEDICAID

## 2025-01-02 NOTE — TELEPHONE ENCOUNTER
Pt reports he been taking suboxone for a couple of months. Pt reports for a few weeks he has been having intermittent hives to his abdomen. Pt also reports withdrawal symptoms of diarrhea, HA, tremors, emesis, insomnia, chills, and body aches for a week. Pt denies recent drug use, SOB, hallucinations. Care advice to go to ED now. Pt verbalizes understanding.   Reason for Disposition   Feeling very shaky (i.e., visible tremors of hands)    Additional Information   Negative: Coma (e.g., not moving, not talking, not responding to stimuli)   Negative: Difficult to awaken or acting confused (e.g., disoriented, slurred speech)   Negative: Seeing or hearing or feeling things that are not there (i.e., auditory, visual, or tactile hallucinations)   Negative: Slow, shallow and weak breathing   Negative: Seizure   Negative: Sounds like a life-threatening emergency to the triager   Negative: Violent behavior, or threatening to physically hurt or kill someone   Questions or concerns about opioid use, misuse, dependence, or withdrawal   Negative: Coma (e.g., not moving, not talking, not responding to stimuli)   Negative: Difficult to awaken or acting confused (e.g., disoriented, slurred speech)   Negative: Slow, shallow and weak breathing   Negative: Seeing, hearing, or feeling things that are not there (i.e., visual, auditory, or tactile hallucinations)   Negative: Seizure   Negative: Violent behavior, or threatening to physically hurt or kill someone   Negative: Ingested drugs to hide them from police or others (e.g., packets of heroin or cocaine)   Negative: Sounds like a life-threatening emergency to the triager   Negative: Very strange, paranoid, or confused behavior    Protocols used: Opioid Use and [id number]-H-FX, Substance Use and Wamscgrl-G-KH

## 2025-01-22 ENCOUNTER — HOSPITAL ENCOUNTER (EMERGENCY)
Facility: HOSPITAL | Age: 30
Discharge: HOME | End: 2025-01-22
Attending: EMERGENCY MEDICINE
Payer: MEDICAID

## 2025-01-22 VITALS
OXYGEN SATURATION: 100 % | BODY MASS INDEX: 27.09 KG/M2 | DIASTOLIC BLOOD PRESSURE: 77 MMHG | HEART RATE: 70 BPM | WEIGHT: 200 LBS | TEMPERATURE: 98 F | RESPIRATION RATE: 18 BRPM | HEIGHT: 72 IN | SYSTOLIC BLOOD PRESSURE: 158 MMHG

## 2025-01-22 DIAGNOSIS — Z76.0 MEDICATION REFILL: Primary | ICD-10-CM

## 2025-01-22 DIAGNOSIS — R11.10 VOMITING, UNSPECIFIED VOMITING TYPE, UNSPECIFIED WHETHER NAUSEA PRESENT: ICD-10-CM

## 2025-01-22 PROCEDURE — 82962 GLUCOSE BLOOD TEST: CPT

## 2025-01-22 PROCEDURE — 99283 EMERGENCY DEPT VISIT LOW MDM: CPT

## 2025-01-22 PROCEDURE — 63600175 PHARM REV CODE 636 W HCPCS: Performed by: EMERGENCY MEDICINE

## 2025-01-22 PROCEDURE — 25000003 PHARM REV CODE 250: Performed by: PHYSICIAN ASSISTANT

## 2025-01-22 RX ORDER — ONDANSETRON 8 MG/1
8 TABLET, ORALLY DISINTEGRATING ORAL
Status: COMPLETED | OUTPATIENT
Start: 2025-01-22 | End: 2025-01-22

## 2025-01-22 RX ORDER — ONDANSETRON 4 MG/1
4 TABLET, ORALLY DISINTEGRATING ORAL EVERY 6 HOURS
Status: DISCONTINUED | OUTPATIENT
Start: 2025-01-22 | End: 2025-01-22 | Stop reason: HOSPADM

## 2025-01-22 RX ORDER — ONDANSETRON 4 MG/1
4 TABLET, ORALLY DISINTEGRATING ORAL EVERY 6 HOURS
Status: DISCONTINUED | OUTPATIENT
Start: 2025-01-22 | End: 2025-01-22 | Stop reason: CLARIF

## 2025-01-22 RX ORDER — BUPRENORPHINE AND NALOXONE 2; .5 MG/1; MG/1
4 FILM, SOLUBLE BUCCAL; SUBLINGUAL
Status: COMPLETED | OUTPATIENT
Start: 2025-01-22 | End: 2025-01-22

## 2025-01-22 RX ADMIN — ONDANSETRON 8 MG: 8 TABLET, ORALLY DISINTEGRATING ORAL at 07:01

## 2025-01-22 RX ADMIN — BUPRENORPHINE AND NALOXONE 4 FILM: 2; .5 FILM BUCCAL; SUBLINGUAL at 07:01

## 2025-01-22 NOTE — DISCHARGE INSTRUCTIONS

## 2025-01-22 NOTE — ED PROVIDER NOTES
Encounter Date: 1/22/2025       History     Chief Complaint   Patient presents with    Withdrawal     Pt arrived to ED with CC withdrawal symptoms. Pt has been out of suboxone x 3days, now having generalized body pain.     Complaint:    Medication refill    HPI:     29 year old male with history of hypertension history opiate abuse on suboxone presenting requesting course of suboxone.  Due to inclement mother there have been road closures and pharmacy closures preventing patient from filling his medications.  He states he will be able to fill them in 2 days when the pharmacy opens again.  He reports he is having generalized myalgias with associated nausea vomiting and abdominal pain that feels similar to history of withdrawals. Last dose of suboxone 4 days ago.         The history is provided by the patient.     Review of patient's allergies indicates:  No Known Allergies  Past Medical History:   Diagnosis Date    Hypertension     Urinary retention 06/27/2024     Past Surgical History:   Procedure Laterality Date    INCISION OF PENIS N/A 6/27/2024    Procedure: Incision, Penis, DIstal Penile Shunt;  Surgeon: Donald Smith MD;  Location: [facility];  Service: Urology;  Laterality: N/A;  11 blade, 50 cc syringes     No family history on file.  Social History     Tobacco Use    Smoking status: Never    Smokeless tobacco: Never   Substance Use Topics    Alcohol use: Yes     Comment: OCC    Drug use: Not Currently     Review of Systems   Constitutional:  Negative for chills and fever.   HENT:  Negative for congestion, ear pain, rhinorrhea and sore throat.    Eyes:  Negative for redness.   Respiratory:  Negative for shortness of breath and stridor.    Cardiovascular:  Negative for chest pain.   Gastrointestinal:  Positive for nausea and vomiting. Negative for abdominal pain, constipation and diarrhea.   Genitourinary:  Negative for dysuria, frequency, hematuria and urgency.   Musculoskeletal:  Positive for myalgias.  Negative for back pain and neck pain.   Skin:  Negative for rash.   Neurological:  Negative for dizziness, speech difficulty, weakness, light-headedness and numbness.   Hematological:  Does not bruise/bleed easily.   Psychiatric/Behavioral:  Negative for confusion.        Physical Exam     Initial Vitals [01/22/25 0649]   BP Pulse Resp Temp SpO2   (!) 158/77 70 18 98 °F (36.7 °C) 100 %      MAP       --         Physical Exam    Nursing note and vitals reviewed.  Constitutional: He appears well-developed and well-nourished. No distress.   HENT:   Head: Normocephalic.   Right Ear: External ear normal.   Left Ear: External ear normal.   Eyes: Conjunctivae are normal.   Cardiovascular:  Normal rate and regular rhythm.     Exam reveals no gallop and no friction rub.       No murmur heard.  Pulmonary/Chest: Breath sounds normal. No respiratory distress. He has no wheezes. He has no rhonchi. He has no rales.   Abdominal: Abdomen is soft. He exhibits no distension. There is no abdominal tenderness. There is no rebound and no guarding.   Musculoskeletal:         General: Normal range of motion.     Neurological: He is alert.   Skin: Skin is warm and dry. No rash noted.   Psychiatric: He has a normal mood and affect. His behavior is normal. Judgment and thought content normal.         ED Course   Procedures  Labs Reviewed   POCT GLUCOSE MONITORING CONTINUOUS          Imaging Results    None          Medications   ondansetron disintegrating tablet 4 mg (has no administration in time range)   ondansetron disintegrating tablet 8 mg (8 mg Oral Given 1/22/25 0756)   buprenorphine-naloxone 2-0.5 mg SL film 4 Film (4 Film Sublingual Given 1/22/25 0756)     Medical Decision Making  29-year-old male on requesting short course of medications close medication has pharmacy due to blood cultures from current frozen weather condition.  Patient complaining of generalized myalgias nausea vomiting.  Zofran given.  He is tolerating p.o..   Discussed with my attending Dr Palacios who provided course of suboxone until pt able to follow up with his pharmacy as I am unable to prescribe this medication. Will have pt follow up with his clinic and return to ER for worsening or as needed. Discussed with Dr. Palacios who agrees with assessment and plan.     Risk  Prescription drug management.               ED Course as of 01/22/25 0903 Wed Jan 22, 2025   0724  reviewed, patient filled Suboxone on January 11th. [LH]      ED Course User Index  [LH] Taylor Palacios MD                           Clinical Impression:  Final diagnoses:  [Z76.0] Medication refill (Primary)  [R11.10] Vomiting, unspecified vomiting type, unspecified whether nausea present          ED Disposition Condition    Discharge           ED Prescriptions    None       Follow-up Information       Follow up With Specialties Details Why Contact Info    Jose Yeung, YANELISP-C Family Medicine Schedule an appointment as soon as possible for a visit in 2 days for follow up [id number] [facility]  [facility] [address]  [phone]      [facility] Emergency Dept Emergency Medicine Go to  As needed, If symptoms worsen [address] [facility] [place] [address]  [phone]             Diann Shaffer PA-C  01/22/25 0908       Diann Shaffer PA-C  01/22/25 0909

## 2025-01-23 LAB — POCT GLUCOSE: 96 MG/DL (ref 70–110)

## 2025-02-17 ENCOUNTER — HOSPITAL ENCOUNTER (EMERGENCY)
Facility: HOSPITAL | Age: 30
Discharge: HOME | End: 2025-02-17
Attending: STUDENT IN AN ORGANIZED HEALTH CARE EDUCATION/TRAINING PROGRAM
Payer: MEDICAID

## 2025-02-17 VITALS
DIASTOLIC BLOOD PRESSURE: 78 MMHG | RESPIRATION RATE: 18 BRPM | WEIGHT: 199.94 LBS | TEMPERATURE: 98 F | BODY MASS INDEX: 27.08 KG/M2 | SYSTOLIC BLOOD PRESSURE: 136 MMHG | HEART RATE: 71 BPM | OXYGEN SATURATION: 98 % | HEIGHT: 72 IN

## 2025-02-17 DIAGNOSIS — F11.93 OPIATE WITHDRAWAL: ICD-10-CM

## 2025-02-17 DIAGNOSIS — R05.9 COUGH: Primary | ICD-10-CM

## 2025-02-17 LAB
ALBUMIN SERPL BCP-MCNC: 4.8 G/DL (ref 3.5–5.2)
ALP SERPL-CCNC: 50 U/L (ref 40–150)
ALT SERPL W/O P-5'-P-CCNC: 19 U/L (ref 10–44)
ANION GAP SERPL CALC-SCNC: 15 MMOL/L (ref 8–16)
AST SERPL-CCNC: 24 U/L (ref 10–40)
BASOPHILS # BLD AUTO: 0.02 K/UL (ref 0–0.2)
BASOPHILS NFR BLD: 0.4 % (ref 0–1.9)
BILIRUB SERPL-MCNC: 0.6 MG/DL (ref 0.1–1)
BILIRUB UR QL STRIP: NEGATIVE
BUN SERPL-MCNC: 10 MG/DL (ref 6–20)
CALCIUM SERPL-MCNC: 10.3 MG/DL (ref 8.7–10.5)
CHLORIDE SERPL-SCNC: 103 MMOL/L (ref 95–110)
CK SERPL-CCNC: 503 U/L (ref 20–200)
CLARITY UR: CLEAR
CO2 SERPL-SCNC: 24 MMOL/L (ref 23–29)
COLOR UR: YELLOW
CREAT SERPL-MCNC: 1.2 MG/DL (ref 0.5–1.4)
CTP QC/QA: YES
CTP QC/QA: YES
DIFFERENTIAL METHOD BLD: NORMAL
EOSINOPHIL # BLD AUTO: 0.1 K/UL (ref 0–0.5)
EOSINOPHIL NFR BLD: 1 % (ref 0–8)
ERYTHROCYTE [DISTWIDTH] IN BLOOD BY AUTOMATED COUNT: 14 % (ref 11.5–14.5)
EST. GFR  (NO RACE VARIABLE): >60 ML/MIN/1.73 M^2
GLUCOSE SERPL-MCNC: 106 MG/DL (ref 70–110)
GLUCOSE UR QL STRIP: NEGATIVE
HCT VFR BLD AUTO: 47.7 % (ref 40–54)
HGB BLD-MCNC: 16.3 G/DL (ref 14–18)
HGB UR QL STRIP: NEGATIVE
HIV1+2 IGG SERPL QL IA.RAPID: NORMAL
IMM GRANULOCYTES # BLD AUTO: 0.01 K/UL (ref 0–0.04)
IMM GRANULOCYTES NFR BLD AUTO: 0.2 % (ref 0–0.5)
KETONES UR QL STRIP: NEGATIVE
LEUKOCYTE ESTERASE UR QL STRIP: NEGATIVE
LYMPHOCYTES # BLD AUTO: 1.9 K/UL (ref 1–4.8)
LYMPHOCYTES NFR BLD: 37.8 % (ref 18–48)
MCH RBC QN AUTO: 28.7 PG (ref 27–31)
MCHC RBC AUTO-ENTMCNC: 34.2 G/DL (ref 32–36)
MCV RBC AUTO: 84 FL (ref 82–98)
MONOCYTES # BLD AUTO: 0.3 K/UL (ref 0.3–1)
MONOCYTES NFR BLD: 6.4 % (ref 4–15)
NEUTROPHILS # BLD AUTO: 2.7 K/UL (ref 1.8–7.7)
NEUTROPHILS NFR BLD: 54.2 % (ref 38–73)
NITRITE UR QL STRIP: NEGATIVE
NRBC BLD-RTO: 0 /100 WBC
PH UR STRIP: 7 [PH] (ref 5–8)
PLATELET # BLD AUTO: 317 K/UL (ref 150–450)
PMV BLD AUTO: 9.4 FL (ref 9.2–12.9)
POC MOLECULAR INFLUENZA A AGN: NEGATIVE
POC MOLECULAR INFLUENZA B AGN: NEGATIVE
POTASSIUM SERPL-SCNC: 3.8 MMOL/L (ref 3.5–5.1)
PROT SERPL-MCNC: 9.1 G/DL (ref 6–8.4)
PROT UR QL STRIP: ABNORMAL
RBC # BLD AUTO: 5.68 M/UL (ref 4.6–6.2)
SARS-COV-2 RDRP RESP QL NAA+PROBE: NEGATIVE
SODIUM SERPL-SCNC: 142 MMOL/L (ref 136–145)
SP GR UR STRIP: 1.02 (ref 1–1.03)
URN SPEC COLLECT METH UR: ABNORMAL
UROBILINOGEN UR STRIP-ACNC: NEGATIVE EU/DL
WBC # BLD AUTO: 5.02 K/UL (ref 3.9–12.7)

## 2025-02-17 PROCEDURE — 85025 COMPLETE CBC W/AUTO DIFF WBC: CPT | Performed by: STUDENT IN AN ORGANIZED HEALTH CARE EDUCATION/TRAINING PROGRAM

## 2025-02-17 PROCEDURE — 63600175 PHARM REV CODE 636 W HCPCS: Performed by: STUDENT IN AN ORGANIZED HEALTH CARE EDUCATION/TRAINING PROGRAM

## 2025-02-17 PROCEDURE — 82550 ASSAY OF CK (CPK): CPT | Performed by: STUDENT IN AN ORGANIZED HEALTH CARE EDUCATION/TRAINING PROGRAM

## 2025-02-17 PROCEDURE — 80053 COMPREHEN METABOLIC PANEL: CPT | Performed by: STUDENT IN AN ORGANIZED HEALTH CARE EDUCATION/TRAINING PROGRAM

## 2025-02-17 PROCEDURE — 87635 SARS-COV-2 COVID-19 AMP PRB: CPT | Performed by: STUDENT IN AN ORGANIZED HEALTH CARE EDUCATION/TRAINING PROGRAM

## 2025-02-17 PROCEDURE — 87502 INFLUENZA DNA AMP PROBE: CPT

## 2025-02-17 PROCEDURE — 96374 THER/PROPH/DIAG INJ IV PUSH: CPT

## 2025-02-17 PROCEDURE — 99284 EMERGENCY DEPT VISIT MOD MDM: CPT | Mod: 25

## 2025-02-17 PROCEDURE — 81003 URINALYSIS AUTO W/O SCOPE: CPT | Performed by: STUDENT IN AN ORGANIZED HEALTH CARE EDUCATION/TRAINING PROGRAM

## 2025-02-17 PROCEDURE — 86703 HIV-1/HIV-2 1 RESULT ANTBDY: CPT | Performed by: STUDENT IN AN ORGANIZED HEALTH CARE EDUCATION/TRAINING PROGRAM

## 2025-02-17 RX ORDER — PROMETHAZINE HYDROCHLORIDE AND DEXTROMETHORPHAN HYDROBROMIDE 6.25; 15 MG/5ML; MG/5ML
5 SYRUP ORAL EVERY 6 HOURS PRN
Qty: 180 ML | Refills: 0 | Status: SHIPPED | OUTPATIENT
Start: 2025-02-17 | End: 2025-02-27

## 2025-02-17 RX ORDER — BUPRENORPHINE AND NALOXONE 8; 2 MG/1; MG/1
1 FILM, SOLUBLE BUCCAL; SUBLINGUAL 3 TIMES DAILY
Qty: 90 FILM | Refills: 0 | Status: SHIPPED | OUTPATIENT
Start: 2025-02-17 | End: 2025-02-17

## 2025-02-17 RX ORDER — BUPRENORPHINE AND NALOXONE 8; 2 MG/1; MG/1
1 FILM, SOLUBLE BUCCAL; SUBLINGUAL 4 TIMES DAILY
Qty: 28 FILM | Refills: 0 | Status: SHIPPED | OUTPATIENT
Start: 2025-02-17 | End: 2025-02-24

## 2025-02-17 RX ORDER — ONDANSETRON HYDROCHLORIDE 2 MG/ML
4 INJECTION, SOLUTION INTRAVENOUS
Status: COMPLETED | OUTPATIENT
Start: 2025-02-17 | End: 2025-02-17

## 2025-02-17 RX ORDER — BUPRENORPHINE AND NALOXONE 8; 2 MG/1; MG/1
1 FILM, SOLUBLE BUCCAL; SUBLINGUAL 3 TIMES DAILY
Qty: 21 FILM | Refills: 0 | Status: SHIPPED | OUTPATIENT
Start: 2025-02-17 | End: 2025-02-17 | Stop reason: ALTCHOICE

## 2025-02-17 RX ORDER — BUPRENORPHINE AND NALOXONE 2; .5 MG/1; MG/1
4 FILM, SOLUBLE BUCCAL; SUBLINGUAL ONCE
Status: COMPLETED | OUTPATIENT
Start: 2025-02-17 | End: 2025-02-17

## 2025-02-17 RX ADMIN — SODIUM CHLORIDE, POTASSIUM CHLORIDE, SODIUM LACTATE AND CALCIUM CHLORIDE 1000 ML: 600; 310; 30; 20 INJECTION, SOLUTION INTRAVENOUS at 05:02

## 2025-02-17 RX ADMIN — BUPRENORPHINE AND NALOXONE 4 FILM: 2; .5 FILM BUCCAL; SUBLINGUAL at 05:02

## 2025-02-17 RX ADMIN — ONDANSETRON 4 MG: 2 INJECTION INTRAMUSCULAR; INTRAVENOUS at 05:02

## 2025-02-17 NOTE — ED TRIAGE NOTES
Daniele Camacho, a 30 y.o. male presents to the ED via EMS w/ complaint of Nausea. Pt reports nausea, vomiting, body aches, chills, HA, and cough that worsen last night. Pt is tearful. Pt states he has been without Suboxone for 3 days. Pt states he has taken aleve with no relief. Pt denies CP, SOB. Pt is AAOx4.

## 2025-02-17 NOTE — Clinical Note
"Daniele Curran"Doug was seen and treated in our emergency department on 2/17/2025.  He may return to work on 02/18/2025.       If you have any questions or concerns, please don't hesitate to call.      Connie Bland RN    "

## 2025-02-17 NOTE — ED PROVIDER NOTES
Encounter Date: 2/17/2025       History     Chief Complaint   Patient presents with    Nausea     Pt c/o N/V and abd cramping since he has been out of his Suboxone 3 days ago. Pt states he was unable to get it filled by his MD. Also c/o flu like symptoms. + Cough     HPI    30-year-old male with a history of hypertension, opiate dependence presents to ED for evaluation body aches, nausea, vomiting for the past 3 days.  Notes he ran out of his Suboxone 3 days ago.  He has been taking an extra dose every day causing him to run out early.  He is due to see his regular physician tomorrow.  Notes he has been coughing up some brown product for a week.  His partner has had an upper respiratory illness.     Review of patient's allergies indicates:  No Known Allergies  Past Medical History:   Diagnosis Date    Hypertension     Urinary retention 06/27/2024     Past Surgical History:   Procedure Laterality Date    INCISION OF PENIS N/A 6/27/2024    Procedure: Incision, Penis, DIstal Penile Shunt;  Surgeon: Donald Smith MD;  Location: [facility];  Service: Urology;  Laterality: N/A;  11 blade, 50 cc syringes     No family history on file.  Social History[1]  Review of Systems   Constitutional:  Negative for fever.   HENT:  Negative for sore throat.    Respiratory:  Positive for cough. Negative for shortness of breath.    Cardiovascular:  Negative for chest pain.   Gastrointestinal:  Positive for diarrhea, nausea and vomiting. Negative for abdominal pain.   Genitourinary:  Negative for dysuria.   Musculoskeletal:  Positive for myalgias. Negative for back pain.   Skin:  Negative for rash.   Neurological:  Negative for weakness.   Hematological:  Does not bruise/bleed easily.       Physical Exam     Initial Vitals [02/17/25 0527]   BP Pulse Resp Temp SpO2   128/72 76 19 98.1 °F (36.7 °C) 97 %      MAP       --         Physical Exam    Constitutional: Vital signs are normal. He appears well-developed and well-nourished.   Non-toxic appearance. He does not have a sickly appearance. He does not appear ill.   He appears mildly uncomfortable   HENT:   Head: Normocephalic and atraumatic. Mouth/Throat: Mucous membranes are normal.   Eyes: EOM are normal.   Pupils are dilated and reactive to light  He has excessive tearing   Neck: Neck supple.   Cardiovascular:  Normal rate and regular rhythm.           Pulmonary/Chest: He has no wheezes. He has no rhonchi. He has no rales.   Abdominal: Abdomen is soft. Bowel sounds are normal. There is no abdominal tenderness. There is no rebound and no guarding.   Musculoskeletal:      Cervical back: Neck supple.     Neurological: He is alert.   Appears mildly tremulous   Skin: Skin is warm and dry. No rash noted.   Psychiatric: His mood appears anxious.         ED Course   Procedures  Labs Reviewed   COMPREHENSIVE METABOLIC PANEL - Abnormal       Result Value    Sodium 142      Potassium 3.8      Chloride 103      CO2 24      Glucose 106      BUN 10      Creatinine 1.2      Calcium 10.3      Total Protein 9.1 (*)     Albumin 4.8      Total Bilirubin 0.6      Alkaline Phosphatase 50      AST 24      ALT 19      eGFR >60      Anion Gap 15     CK - Abnormal     (*)    URINALYSIS, REFLEX TO URINE CULTURE - Abnormal    Specimen UA Urine, Unspecified      Color, UA Yellow      Appearance, UA Clear      pH, UA 7.0      Specific Gravity, UA 1.020      Protein, UA Trace (*)     Glucose, UA Negative      Ketones, UA Negative      Bilirubin (UA) Negative      Occult Blood UA Negative      Nitrite, UA Negative      Urobilinogen, UA Negative      Leukocytes, UA Negative      Narrative:     Specimen Source->Urine   CBC W/ AUTO DIFFERENTIAL    WBC 5.02      RBC 5.68      Hemoglobin 16.3      Hematocrit 47.7      MCV 84      MCH 28.7      MCHC 34.2      RDW 14.0      Platelets 317      MPV 9.4      Immature Granulocytes 0.2      Gran # (ANC) 2.7      Immature Grans (Abs) 0.01      Lymph # 1.9      Mono # 0.3       "Eos # 0.1      Baso # 0.02      nRBC 0      Gran % 54.2      Lymph % 37.8      Mono % 6.4      Eosinophil % 1.0      Basophil % 0.4      Differential Method Automated     RAPID HIV    HIV Rapid Testing Non-Reactive     SARS-COV-2 RDRP GENE    POC Rapid COVID Negative       Acceptable Yes     POCT INFLUENZA A/B MOLECULAR    POC Molecular Influenza A Ag Negative      POC Molecular Influenza B Ag Negative       Acceptable Yes            Imaging Results              X-Ray Chest 1 View (Final result)  Result time 02/17/25 06:10:45      Final result by Dewayne Fuentes MD (02/17/25 06:10:45)                   Impression:      No acute cardiopulmonary finding identified on this single view.      Electronically signed by: Dewayne Fuentes MD  Date:    02/17/2025  Time:    06:10               Narrative:    EXAMINATION:  XR CHEST 1 VIEW    CLINICAL HISTORY:  Provided history is "  Cough, unspecified".    TECHNIQUE:  One view of the chest.    COMPARISON:  10/07/2023.    FINDINGS:  Cardiac silhouette is not enlarged.  No focal consolidation.  No sizable pleural effusion.  No pneumothorax.                                       Medications   lactated ringers bolus 1,000 mL (1,000 mLs Intravenous New Bag 2/17/25 0547)   ondansetron injection 4 mg (4 mg Intravenous Given 2/17/25 0546)   buprenorphine-naloxone 2-0.5 mg SL film 4 Film (4 Film Sublingual Given 2/17/25 0547)     Medical Decision Making  Amount and/or Complexity of Data Reviewed  Labs: ordered.  Radiology: ordered.    Risk  Prescription drug management.    Vitals unremarkable   Patient is somewhat uncomfortable appearing   Suspect he is withdrawing off Suboxone  May also have a concomitant viral illness, bronchitis, pneumonia  Cow's of 11  Given 8 mg of Suboxone, 4 mg of Zofran, 1 L of LR  CBC, CMP, CK, Flu/COVID, chest x-ray, rapid HIV pending  Signed out to Dr. Camilo Zuñiga to reassess and dispo    This is Dr. Page likely dictating.  I " assumed care of this patient at 6:10 a.m..  Chest x-ray is unremarkable.  HIV testing, CBC is unremarkable.  The patient is improved after treatment in the emergency room with Suboxone.  I await the results of the CK and metabolic profile.   CPK has a trivial elevation of 500.  The CMP is essentially unremarkable.  I will discharge this patient outpatient evaluation and treatment on Suboxone.  I believe this is likely withdrawal.  Chest x-ray fails to reveal any evidence of pneumonia.                                    Clinical Impression:  Final diagnoses:  [R05.9] Cough (Primary)  [F11.93] Opiate withdrawal                   Guicho Zuñiga MD  02/17/25 0545       Camilo Zuñiga MD  02/17/25 0629         [1]   Social History  Tobacco Use    Smoking status: Never    Smokeless tobacco: Never   Substance Use Topics    Alcohol use: Yes     Comment: OCC    Drug use: Not Currently        Camilo Zuñiga MD  02/17/25 0827

## 2025-02-17 NOTE — DISCHARGE INSTRUCTIONS
A prescription has been provided to bridge you to follow up with your regular provider next week.  In the future you should take the prescription as directed in order to avoid running out early.  Please return immediately if you get worse or if new problems develop.  Please follow-up with your primary care doctor this week.

## 2025-02-27 ENCOUNTER — OFFICE VISIT (OUTPATIENT)
Facility: CLINIC | Age: 30
End: 2025-02-27
Payer: MEDICAID

## 2025-02-27 ENCOUNTER — LAB VISIT (OUTPATIENT)
Dept: LAB | Facility: HOSPITAL | Age: 30
End: 2025-02-27
Payer: MEDICAID

## 2025-02-27 VITALS
OXYGEN SATURATION: 99 % | HEART RATE: 98 BPM | DIASTOLIC BLOOD PRESSURE: 80 MMHG | RESPIRATION RATE: 18 BRPM | SYSTOLIC BLOOD PRESSURE: 128 MMHG | WEIGHT: 199.31 LBS | HEIGHT: 72 IN | BODY MASS INDEX: 27 KG/M2 | TEMPERATURE: 98 F

## 2025-02-27 DIAGNOSIS — I10 ESSENTIAL HYPERTENSION: ICD-10-CM

## 2025-02-27 DIAGNOSIS — F11.10 OPIOID ABUSE: ICD-10-CM

## 2025-02-27 DIAGNOSIS — Z11.3 SCREENING FOR STD (SEXUALLY TRANSMITTED DISEASE): ICD-10-CM

## 2025-02-27 DIAGNOSIS — Z13.84 ENCOUNTER FOR SCREENING FOR DENTAL DISORDER: ICD-10-CM

## 2025-02-27 DIAGNOSIS — Z00.00 ANNUAL PHYSICAL EXAM: Primary | ICD-10-CM

## 2025-02-27 DIAGNOSIS — G47.00 INSOMNIA, UNSPECIFIED TYPE: ICD-10-CM

## 2025-02-27 DIAGNOSIS — Z00.00 ANNUAL PHYSICAL EXAM: ICD-10-CM

## 2025-02-27 PROBLEM — A53.9 SYPHILIS: Status: ACTIVE | Noted: 2021-04-13

## 2025-02-27 PROBLEM — S01.81XD FOREHEAD LACERATION, SUBSEQUENT ENCOUNTER: Status: ACTIVE | Noted: 2020-02-27

## 2025-02-27 PROBLEM — M25.559 HIP PAIN: Status: ACTIVE | Noted: 2021-04-13

## 2025-02-27 LAB
CHOLEST SERPL-MCNC: 177 MG/DL (ref 120–199)
CHOLEST/HDLC SERPL: 3.5 {RATIO} (ref 2–5)
HDLC SERPL-MCNC: 50 MG/DL (ref 40–75)
HDLC SERPL: 28.2 % (ref 20–50)
LDLC SERPL CALC-MCNC: 103 MG/DL (ref 63–159)
NONHDLC SERPL-MCNC: 127 MG/DL
TRIGL SERPL-MCNC: 120 MG/DL (ref 30–150)
TSH SERPL DL<=0.005 MIU/L-ACNC: 0.81 UIU/ML (ref 0.4–4)

## 2025-02-27 PROCEDURE — 86593 SYPHILIS TEST NON-TREP QUANT: CPT

## 2025-02-27 PROCEDURE — 83036 HEMOGLOBIN GLYCOSYLATED A1C: CPT

## 2025-02-27 PROCEDURE — 3044F HG A1C LEVEL LT 7.0%: CPT | Mod: CPTII,,,

## 2025-02-27 PROCEDURE — 99215 OFFICE O/P EST HI 40 MIN: CPT | Mod: PBBFAC,PN

## 2025-02-27 PROCEDURE — 1160F RVW MEDS BY RX/DR IN RCRD: CPT | Mod: CPTII,,,

## 2025-02-27 PROCEDURE — 36415 COLL VENOUS BLD VENIPUNCTURE: CPT

## 2025-02-27 PROCEDURE — 3008F BODY MASS INDEX DOCD: CPT | Mod: CPTII,,,

## 2025-02-27 PROCEDURE — 86803 HEPATITIS C AB TEST: CPT

## 2025-02-27 PROCEDURE — 80061 LIPID PANEL: CPT

## 2025-02-27 PROCEDURE — 3074F SYST BP LT 130 MM HG: CPT | Mod: CPTII,,,

## 2025-02-27 PROCEDURE — 84443 ASSAY THYROID STIM HORMONE: CPT

## 2025-02-27 PROCEDURE — 1159F MED LIST DOCD IN RCRD: CPT | Mod: CPTII,,,

## 2025-02-27 PROCEDURE — 86592 SYPHILIS TEST NON-TREP QUAL: CPT

## 2025-02-27 PROCEDURE — 99385 PREV VISIT NEW AGE 18-39: CPT | Mod: S$PBB,1E,GY,

## 2025-02-27 PROCEDURE — 4010F ACE/ARB THERAPY RXD/TAKEN: CPT | Mod: CPTII,,,

## 2025-02-27 PROCEDURE — 87661 TRICHOMONAS VAGINALIS AMPLIF: CPT

## 2025-02-27 PROCEDURE — 87340 HEPATITIS B SURFACE AG IA: CPT

## 2025-02-27 PROCEDURE — 3079F DIAST BP 80-89 MM HG: CPT | Mod: CPTII,,,

## 2025-02-27 PROCEDURE — 87389 HIV-1 AG W/HIV-1&-2 AB AG IA: CPT

## 2025-02-27 PROCEDURE — 99999 PR PBB SHADOW E&M-EST. PATIENT-LVL V: CPT | Mod: PBBFAC,,,

## 2025-02-27 RX ORDER — LOSARTAN POTASSIUM 100 MG/1
100 TABLET ORAL DAILY
COMMUNITY
Start: 2024-09-30

## 2025-02-28 LAB
ESTIMATED AVG GLUCOSE: 111 MG/DL (ref 68–131)
HBA1C MFR BLD: 5.5 % (ref 4–5.6)
HBV SURFACE AG SERPL QL IA: NORMAL
HCV AB SERPL QL IA: NORMAL
HIV 1+2 AB+HIV1 P24 AG SERPL QL IA: NORMAL

## 2025-03-01 LAB
RPR SER QL: REACTIVE
RPR SER-TITR: ABNORMAL {TITER}

## 2025-03-02 ENCOUNTER — RESULTS FOLLOW-UP (OUTPATIENT)
Facility: CLINIC | Age: 30
End: 2025-03-02

## 2025-03-02 NOTE — PROGRESS NOTES
SUBJECTIVE     Chief Complaint   Patient presents with    Establish Care     Pt states he is coming in to est care with a new provider        HPI  Daniele Camacho is a 30 y.o. male with multiple medical diagnoses as listed in the medical history and problem list that presents for establishment of care, pt is new to me and to the clinic.     History of Present Illness    CHIEF COMPLAINT:  Mr. Camacho presents today as a new patient for establishment of care with new PCP and clinic.    WEIGHT CHANGES:  He reports unintentional weight loss of 31 lbs since August without dietary changes or increased physical activity.    MEDICAL HISTORY:  He has hypertension with recent good blood pressure control. He has sickle cell trait. He experienced urinary retention in 2024 due to high dosage of Seroquel, and priapism in June 2024 requiring penile incision and drainage. He has a history of wrist fracture requiring surgery. He has previously been treated for syphilis with no recurrences and has a history of herpes without recent outbreaks.    SLEEP:  He reports chronic insomnia since age 21, attributed to stress from work responsibilities and concurrent schooling.    MEDICATIONS:  He takes Losartan 100mg for blood pressure, Ambien 10mg as needed for insomnia, and Bupropion/naloxone for opioid addiction treatment.    SUBSTANCE USE:  He denies cigarette use. He consumes alcohol occasionally, approximately 4 times per year. He uses marijuana 2-3 times weekly in social settings. He has a history of opioid addiction to Narco (hydrocodone/acetaminophen) which developed during college football.    FAMILY HISTORY:  His mother has arthritis and sickle cell trait. His father is alive and healthy. He has two healthy biological brothers.    SOCIAL HISTORY:  He is sexually active with male partners.      ROS:  General: -fever, -chills, -fatigue, -weight gain, +weight loss  Eyes: -vision changes, -redness, -discharge  ENT: -ear pain,  -nasal congestion, -sore throat  Cardiovascular: -chest pain, -palpitations, -lower extremity edema  Respiratory: -cough, -shortness of breath  Gastrointestinal: -abdominal pain, -nausea, -vomiting, -diarrhea, -constipation, -blood in stool  Genitourinary: -dysuria, -hematuria, -frequency  Musculoskeletal: -joint pain, -muscle pain  Skin: -rash, -lesion  Neurological: -headache, -dizziness, -numbness, -tingling  Psychiatric: -anxiety, -depression, +sleep difficulty         PAST MEDICAL HISTORY:  Past Medical History:   Diagnosis Date    Hypertension     Insomnia     Opioid abuse     Sickle cell trait     Urinary retention 06/27/2024       PAST SURGICAL HISTORY:  Past Surgical History:   Procedure Laterality Date    FRACTURE SURGERY  2014    Wrist    INCISION OF PENIS N/A 06/27/2024    Procedure: Incision, Penis, DIstal Penile Shunt;  Surgeon: Donald Smith MD;  Location: [facility];  Service: Urology;  Laterality: N/A;  11 blade, 50 cc syringes       SOCIAL HISTORY:  Social History[1]    FAMILY HISTORY:  Family History   Problem Relation Name Age of Onset    Arthritis Mother Katia     Sickle cell trait Mother Katia     No Known Problems Father      No Known Problems Brother      No Known Problems Brother         ALLERGIES AND MEDICATIONS: updated and reviewed.  Review of patient's allergies indicates:  No Known Allergies  Current Medications[2]    OBJECTIVE     Physical Exam  Vitals:    02/27/25 1432   BP: 128/80   Pulse: 98   Resp: 18   Temp: 98 °F (36.7 °C)    Body mass index is 27.03 kg/m².  Weight: 90.4 kg (199 lb 4.7 oz)   Height: 6' (182.9 cm)     Physical Exam  Constitutional:       General: He is not in acute distress.     Appearance: Normal appearance. He is not ill-appearing or diaphoretic.   HENT:      Right Ear: Tympanic membrane normal. There is no impacted cerumen.      Left Ear: Tympanic membrane normal. There is no impacted cerumen.      Nose: Nose normal. No congestion or rhinorrhea.       Mouth/Throat:      Mouth: Mucous membranes are moist.      Pharynx: Oropharynx is clear. No oropharyngeal exudate or posterior oropharyngeal erythema.   Eyes:      General:         Right eye: No discharge.         Left eye: No discharge.   Cardiovascular:      Rate and Rhythm: Normal rate and regular rhythm.      Pulses: Normal pulses.      Heart sounds: Normal heart sounds.   Pulmonary:      Effort: Pulmonary effort is normal.      Breath sounds: Normal breath sounds.   Abdominal:      General: Bowel sounds are normal. There is no distension.      Palpations: Abdomen is soft.      Tenderness: There is no abdominal tenderness. There is no guarding.   Musculoskeletal:         General: No swelling or tenderness.      Cervical back: Normal range of motion. No rigidity or tenderness.      Right lower leg: No edema.      Left lower leg: No edema.   Skin:     General: Skin is warm and dry.      Findings: No bruising, erythema, lesion or rash.   Neurological:      Mental Status: He is alert and oriented to person, place, and time.      Motor: No weakness.      Gait: Gait normal.   Psychiatric:         Mood and Affect: Mood normal.         Behavior: Behavior normal.         Health Maintenance         Date Due Completion Date    Influenza Vaccine (1) 09/01/2024 10/23/2023    COVID-19 Vaccine (2 - 2024-25 season) 09/01/2024 6/26/2021    TETANUS VACCINE 10/15/2034 10/15/2024    RSV Vaccine (Age 60+ and Pregnant patients) (1 - 1-dose 75+ series) 02/17/2072 ---              ASSESSMENT     30 y.o. male with     1. Annual physical exam    2. Encounter for screening for dental disorder    3. Insomnia, unspecified type    4. Screening for STD (sexually transmitted disease)    5. Essential hypertension        PLAN:     1. Annual physical exam  Counseled on age appropriate medical preventative services, including age appropriate cancer screenings, over all nutritional health, need for a consistent exercise regimen and an over all push  towards maintaining a vigorous and active lifestyle.  Counseled on age appropriate vaccines and discussed upcoming health care needs based on age/gender.  Spent time with patient counseling on need for a good patient/provider relationship moving forward.  Discussed use of common OTC medications and supplements.  Discussed common dietary aids and use of caffeine and the need for good sleep hygiene and stress management.  - Hemoglobin A1C; Future  - Lipid Panel; Future  - TSH; Future    2. Encounter for screening for dental disorder  Oral health screening completed at today's visit. All questions/concerns addressed.     3. Insomnia, unspecified type  New; Addressing    4. Screening for STD (sexually transmitted disease)  New; Assessing  - HIV 1/2 Ag/Ab (4th Gen); Future  - Hepatitis C Antibody; Future  - Hepatitis B Surface Antigen; Future  - C. trachomatis/N. gonorrhoeae by AMP DNA [facility]; Urine; Future  - RPR (for monitoring); Future  - Trichomonas vaginalis, RNA, Qual, Urine; Future    5. Essential hypertension  New; Treating     Assessment & Plan    IMPRESSION:  - Reviewed patient's medical history, including hypertension, urinary retention, penile incision, sickle cell trait, and opioid addiction  - Assessed current medication regimen  - Evaluated recent weight loss of 31 lbs, potentially due to dietary changes  - Considered impact of work stress and school responsibilities on patient's insomnia    HYPERTENSION:  - Continue Losartan 100 mg daily for hypertension management.  - Monitor blood pressure, which is currently well-controlled.  - Evaluate the patient's blood pressure at each visit.  - Acknowledge past diagnosis of hypertension and continue monitoring.    SICKLE CELL TRAIT:  - Monitor the patient's condition, noting the presence of sickle cell trait.    SEXUALLY TRANSMITTED DISEASES (STDS):  - Order STD testing.  - Note the patient's history of herpes and previous Valtrex prescription.  - Order  comprehensive STD testing.  - Order RPR test for syphilis as part of STD screening.  - Note the patient's history of syphilis treatment.  -  the patient on safe sex practices, noting current sexual activity with male partners without protection, but noted current monogamous relationship for an extended period of time now with no new partners .    INSOMNIA:  - Continue Ambien 10 mg as needed for insomnia.  - Reassess the need for Ambien and explore the sources of insomnia at next visit.  - Monitor the patient's sleep patterns and Ambien usage since age 21.    OPIOID ADDICTION:  - Refer to local provider for buprenorphine/naloxone management.  - Acknowledge the patient's history of opioid addiction, specifically Narco, which started during college football.  - Continue current treatment with buprenorphine/naloxone for opioid addiction management.  - Continue buprenorphine/naloxone for opioid addiction management.  - Refer the patient to a specialist for ongoing management of buprenorphine/naloxone treatment.    MARIJUANA USE:  - Monitor the patient's marijuana use, currently 2-3 times per week in social settings.  - Inquire about reasons for marijuana use and educate the patient about potential anxiety-inducing effects.    WEIGHT MANAGEMENT:  - Monitor the patient's unintentional weight loss of 31 lbs since August.  - Order thyroid function tests and screen for diabetes to investigate weight loss.    URINARY RETENTION:  - Monitor for any recurrence of urinary retention, noting the history in 2024 due to high dosage of Seroquel.    OTHER INSTRUCTIONS:  - Note the patient's history of wrist fracture surgery.         RTC in 6 weeks or sooner if needed    Schuyler Hays DNP, APRN, FNP-BC  [facility]  03/02/2025 10:07 AM    This note was generated with the assistance of ambient listening technology. Verbal consent was obtained by the patient and accompanying visitor(s) for the recording of patient  appointment to facilitate this note. I attest to having reviewed and edited the generated note for accuracy, though some syntax or spelling errors may persist. Please contact the author of this note for any clarification.            [1]   Social History  Socioeconomic History    Marital status: Single   Occupational History    Occupation: [employer]   Tobacco Use    Smoking status: Never    Smokeless tobacco: Never   Substance and Sexual Activity    Alcohol use: Not Currently     Comment: OCC    Drug use: Yes     Frequency: 3.0 times per week     Types: Marijuana    Sexual activity: Yes     Partners: Male     Birth control/protection: None     Social Drivers of Health     Financial Resource Strain: Medium Risk (2/26/2025)    Overall Financial Resource Strain (CARDIA)     Difficulty of Paying Living Expenses: Somewhat hard   Food Insecurity: Food Insecurity Present (2/26/2025)    Hunger Vital Sign     Worried About Running Out of Food in the Last Year: Patient declined     Ran Out of Food in the Last Year: Sometimes true   Transportation Needs: No Transportation Needs (2/26/2025)    PRAPARE - Transportation     Lack of Transportation (Medical): No     Lack of Transportation (Non-Medical): No   Physical Activity: Sufficiently Active (2/26/2025)    Exercise Vital Sign     Days of Exercise per Week: 2 days     Minutes of Exercise per Session: 80 min   Stress: Stress Concern Present (2/26/2025)    [employer] - Occupational Stress Questionnaire     Feeling of Stress : Rather much   Housing Stability: Low Risk  (2/26/2025)    Housing Stability Vital Sign     Unable to Pay for Housing in the Last Year: No     Number of Times Moved in the Last Year: 1     Homeless in the Last Year: No   [2]   Current Outpatient Medications   Medication Sig Dispense Refill    losartan (COZAAR) 100 MG tablet Take 100 mg by mouth once daily.      zolpidem (AMBIEN) 10 mg Tab Take 10 mg by mouth every evening.        No current facility-administered medications for this visit.

## 2025-03-03 LAB
SPECIMEN SOURCE: NORMAL
T VAGINALIS RRNA SPEC QL NAA+PROBE: NEGATIVE

## 2025-04-06 ENCOUNTER — HOSPITAL ENCOUNTER (EMERGENCY)
Facility: HOSPITAL | Age: 30
Discharge: HOME | End: 2025-04-06
Attending: STUDENT IN AN ORGANIZED HEALTH CARE EDUCATION/TRAINING PROGRAM
Payer: MEDICAID

## 2025-04-06 VITALS
RESPIRATION RATE: 20 BRPM | DIASTOLIC BLOOD PRESSURE: 81 MMHG | OXYGEN SATURATION: 98 % | HEART RATE: 79 BPM | TEMPERATURE: 98 F | WEIGHT: 200 LBS | SYSTOLIC BLOOD PRESSURE: 144 MMHG | HEIGHT: 72 IN | BODY MASS INDEX: 27.09 KG/M2

## 2025-04-06 DIAGNOSIS — F11.93 OPIOID WITHDRAWAL: Primary | ICD-10-CM

## 2025-04-06 LAB
ABSOLUTE EOSINOPHIL (OHS): 0.09 K/UL
ABSOLUTE MONOCYTE (OHS): 0.35 K/UL (ref 0.3–1)
ABSOLUTE NEUTROPHIL COUNT (OHS): 2.88 K/UL (ref 1.8–7.7)
ALBUMIN SERPL BCP-MCNC: 4.2 G/DL (ref 3.5–5.2)
ALP SERPL-CCNC: 48 UNIT/L (ref 40–150)
ALT SERPL W/O P-5'-P-CCNC: 14 UNIT/L (ref 10–44)
ANION GAP (OHS): 9 MMOL/L (ref 8–16)
AST SERPL-CCNC: 17 UNIT/L (ref 11–45)
BASOPHILS # BLD AUTO: 0.01 K/UL
BASOPHILS NFR BLD AUTO: 0.2 %
BILIRUB SERPL-MCNC: 0.7 MG/DL (ref 0.1–1)
BUN SERPL-MCNC: 12 MG/DL (ref 6–20)
CALCIUM SERPL-MCNC: 9.2 MG/DL (ref 8.7–10.5)
CHLORIDE SERPL-SCNC: 107 MMOL/L (ref 95–110)
CO2 SERPL-SCNC: 24 MMOL/L (ref 23–29)
CREAT SERPL-MCNC: 1 MG/DL (ref 0.5–1.4)
ERYTHROCYTE [DISTWIDTH] IN BLOOD BY AUTOMATED COUNT: 13.9 % (ref 11.5–14.5)
GFR SERPLBLD CREATININE-BSD FMLA CKD-EPI: >60 ML/MIN/1.73/M2
GLUCOSE SERPL-MCNC: 106 MG/DL (ref 70–110)
HCT VFR BLD AUTO: 39.4 % (ref 40–54)
HGB BLD-MCNC: 13.7 GM/DL (ref 14–18)
IMM GRANULOCYTES # BLD AUTO: 0.01 K/UL (ref 0–0.04)
IMM GRANULOCYTES NFR BLD AUTO: 0.2 % (ref 0–0.5)
LIPASE SERPL-CCNC: 27 U/L (ref 4–60)
LYMPHOCYTES # BLD AUTO: 1.73 K/UL (ref 1–4.8)
MCH RBC QN AUTO: 29.3 PG (ref 27–31)
MCHC RBC AUTO-ENTMCNC: 34.8 G/DL (ref 32–36)
MCV RBC AUTO: 84 FL (ref 82–98)
NUCLEATED RBC (/100WBC) (OHS): 0 /100 WBC
PLATELET # BLD AUTO: 314 K/UL (ref 150–450)
PMV BLD AUTO: 9.4 FL (ref 9.2–12.9)
POTASSIUM SERPL-SCNC: 3.8 MMOL/L (ref 3.5–5.1)
PROT SERPL-MCNC: 8.2 GM/DL (ref 6–8.4)
RBC # BLD AUTO: 4.67 M/UL (ref 4.6–6.2)
RELATIVE EOSINOPHIL (OHS): 1.8 %
RELATIVE LYMPHOCYTE (OHS): 34.1 % (ref 18–48)
RELATIVE MONOCYTE (OHS): 6.9 % (ref 4–15)
RELATIVE NEUTROPHIL (OHS): 56.8 % (ref 38–73)
SODIUM SERPL-SCNC: 140 MMOL/L (ref 136–145)
WBC # BLD AUTO: 5.07 K/UL (ref 3.9–12.7)

## 2025-04-06 PROCEDURE — 80053 COMPREHEN METABOLIC PANEL: CPT | Performed by: NURSE PRACTITIONER

## 2025-04-06 PROCEDURE — 99283 EMERGENCY DEPT VISIT LOW MDM: CPT

## 2025-04-06 PROCEDURE — 63600175 PHARM REV CODE 636 W HCPCS: Performed by: STUDENT IN AN ORGANIZED HEALTH CARE EDUCATION/TRAINING PROGRAM

## 2025-04-06 PROCEDURE — 85025 COMPLETE CBC W/AUTO DIFF WBC: CPT | Performed by: NURSE PRACTITIONER

## 2025-04-06 PROCEDURE — 83690 ASSAY OF LIPASE: CPT | Performed by: NURSE PRACTITIONER

## 2025-04-06 RX ORDER — BUPRENORPHINE AND NALOXONE 2; .5 MG/1; MG/1
4 FILM, SOLUBLE BUCCAL; SUBLINGUAL ONCE
Status: COMPLETED | OUTPATIENT
Start: 2025-04-06 | End: 2025-04-06

## 2025-04-06 RX ORDER — BUPRENORPHINE HYDROCHLORIDE AND NALOXONE HYDROCHLORIDE DIHYDRATE 8; 2 MG/1; MG/1
1 TABLET SUBLINGUAL 3 TIMES DAILY
Qty: 12 TABLET | Refills: 0 | Status: SHIPPED | OUTPATIENT
Start: 2025-04-06 | End: 2025-04-10

## 2025-04-06 RX ADMIN — BUPRENORPHINE AND NALOXONE 4 FILM: 2; .5 FILM BUCCAL; SUBLINGUAL at 04:04

## 2025-04-06 NOTE — ED NOTES
Pt states that he is withdrawing from suboxone. Pt has not had medication in 2 days. Pt states that he is taking the medication 3 times daily and is only prescribed 2 times daily. Pt takes 8mg films. Pt states pain is 10/10

## 2025-04-06 NOTE — ED PROVIDER NOTES
Encounter Date: 4/6/2025       History     Chief Complaint   Patient presents with    Abdominal Pain     Pt reported to the ED with ABD with N/V x12hrs. Pt reported he was taking suboxone and ran out two days prior.      30-year-old male presents for evaluation of opioid withdrawal.  He is prescribed Suboxone 3 times daily, but sometimes takes it 4 times daily, so he runs out before the end of the month, and so he is unable to refill his prescription.  He reports nausea, abdominal cramping.  He denies fevers or chills.  Symptoms are similar to prior episodes of opioid withdrawal      Review of patient's allergies indicates:  No Known Allergies  Past Medical History:   Diagnosis Date    Hypertension     Insomnia     Opioid abuse     Sickle cell trait     Urinary retention 06/27/2024     Past Surgical History:   Procedure Laterality Date    FRACTURE SURGERY  2014    Wrist    INCISION OF PENIS N/A 06/27/2024    Procedure: Incision, Penis, DIstal Penile Shunt;  Surgeon: Donald Smith MD;  Location: [facility] OR;  Service: Urology;  Laterality: N/A;  11 blade, 50 cc syringes     Family History   Problem Relation Name Age of Onset    Arthritis Mother Katia     Sickle cell trait Mother Katia     No Known Problems Father      No Known Problems Brother      No Known Problems Brother       Social History[1]  Review of Systems   Gastrointestinal:  Positive for abdominal pain and nausea.       Physical Exam     Initial Vitals [04/06/25 0304]   BP Pulse Resp Temp SpO2   (!) 139/91 80 20 98 °F (36.7 °C) 99 %      MAP       --         Physical Exam    Nursing note and vitals reviewed.  Constitutional: He appears well-developed and well-nourished.   HENT:   Head: Normocephalic and atraumatic. Mouth/Throat: Oropharynx is clear and moist.   Eyes: Conjunctivae and EOM are normal. Pupils are equal, round, and reactive to light.   Neck: No thyromegaly present.   Normal range of motion.  Cardiovascular:  Normal rate, regular rhythm  and intact distal pulses.           Pulmonary/Chest: Breath sounds normal. No respiratory distress. He has no wheezes.   Abdominal: Abdomen is soft. Bowel sounds are normal. He exhibits no distension. There is no abdominal tenderness.   Musculoskeletal:         General: No tenderness or edema. Normal range of motion.      Cervical back: Normal range of motion.     Neurological: He is alert and oriented to person, place, and time. He has normal strength. No cranial nerve deficit.   Skin: Skin is warm and dry. No rash noted.   Psychiatric: He has a normal mood and affect. His behavior is normal. Thought content normal.         ED Course   Procedures  Labs Reviewed   CBC WITH DIFFERENTIAL - Abnormal       Result Value    WBC 5.07      RBC 4.67      HGB 13.7 (*)     HCT 39.4 (*)     MCV 84      MCH 29.3      MCHC 34.8      RDW 13.9      Platelet Count 314      MPV 9.4      Nucleated RBC 0      Neut % 56.8      Lymph % 34.1      Mono % 6.9      Eos % 1.8      Basophil % 0.2      Imm Grans % 0.2      Neut # 2.88      Lymph # 1.73      Mono # 0.35      Eos # 0.09      Baso # 0.01      Imm Grans # 0.01     COMPREHENSIVE METABOLIC PANEL - Normal    Sodium 140      Potassium 3.8      Chloride 107      CO2 24      Glucose 106      BUN 12      Creatinine 1.0      Calcium 9.2      Protein Total 8.2      Albumin 4.2      Bilirubin Total 0.7      ALP 48      AST 17      ALT 14      Anion Gap 9      eGFR >60     LIPASE - Normal    Lipase Level 27     CBC W/ AUTO DIFFERENTIAL    Narrative:     The following orders were created for panel order CBC W/ AUTO DIFFERENTIAL.  Procedure                               Abnormality         Status                     ---------                               -----------         ------                     CBC with Differential[[id number]]       Abnormal            Final result                 Please view results for these tests on the individual orders.          Imaging Results    None           Medications   buprenorphine-naloxone 2-0.5 mg SL film 4 Film (4 Film Sublingual Given 4/6/25 0439)     Medical Decision Making  Well-appearing uncomfortable 22-year-old male presents for evaluation of abdominal cramping and nausea concerning for opioid withdrawal.  He is out of his Suboxone because he takes it too frequently and can not refill it for another 4 days.  Vitals within normal limits.  Exam consistent with opioid withdrawal, treated here with Suboxone with improvement in symptoms.  Labs reassuring.  I do not think he has acute intra-abdominal process given his soft and nontender abdomen.  He would like to go home.  Will discharge him with outpatient follow up and return precautions.  He has a provider who writes for his Suboxone and I advised him to call him today.    Risk  Prescription drug management.                                      Clinical Impression:  Final diagnoses:  [F11.93] Opioid withdrawal (Primary)          ED Disposition Condition    Discharge Stable          ED Prescriptions       Medication Sig Dispense Start Date End Date Auth. Provider    buprenorphine-naloxone 8-2 (SUBOXONE) 8-2 mg Subl Place 1 tablet (8 mg total) under the tongue 3 (three) times daily. for 4 days 12 tablet 4/6/2025 4/10/2025 Junior Aguero MD          Follow-up Information    None            [1]   Social History  Tobacco Use    Smoking status: Never    Smokeless tobacco: Never   Substance Use Topics    Alcohol use: Not Currently     Comment: OCC    Drug use: Yes     Frequency: 3.0 times per week     Types: Marijuana        Junior Aguero MD  04/06/25 1762

## 2025-04-25 ENCOUNTER — HOSPITAL ENCOUNTER (EMERGENCY)
Facility: HOSPITAL | Age: 30
Discharge: HOME | End: 2025-04-25
Attending: EMERGENCY MEDICINE
Payer: MEDICAID

## 2025-04-25 VITALS
HEIGHT: 72 IN | RESPIRATION RATE: 20 BRPM | OXYGEN SATURATION: 98 % | DIASTOLIC BLOOD PRESSURE: 90 MMHG | HEART RATE: 73 BPM | WEIGHT: 200 LBS | BODY MASS INDEX: 27.09 KG/M2 | TEMPERATURE: 99 F | SYSTOLIC BLOOD PRESSURE: 148 MMHG

## 2025-04-25 DIAGNOSIS — Z76.0 ENCOUNTER FOR MEDICATION REFILL: ICD-10-CM

## 2025-04-25 DIAGNOSIS — F11.93: Primary | ICD-10-CM

## 2025-04-25 DIAGNOSIS — F11.23 OPIOID DEPENDENCE WITH WITHDRAWAL: ICD-10-CM

## 2025-04-25 DIAGNOSIS — R19.7 NAUSEA VOMITING AND DIARRHEA: ICD-10-CM

## 2025-04-25 DIAGNOSIS — R11.2 NAUSEA VOMITING AND DIARRHEA: ICD-10-CM

## 2025-04-25 LAB
ALBUMIN SERPL-MCNC: 4.3 G/DL (ref 3.3–5.5)
ALBUMIN SERPL-MCNC: 4.5 G/DL (ref 3.3–5.5)
ALP SERPL-CCNC: 44 U/L (ref 42–141)
ALP SERPL-CCNC: 45 U/L (ref 42–141)
AMPHET UR QL SCN: NEGATIVE
BARBITURATE SCN PRESENT UR: NEGATIVE
BENZODIAZ UR QL SCN: NEGATIVE
BILIRUB SERPL-MCNC: 0.6 MG/DL (ref 0.2–1.6)
BILIRUB SERPL-MCNC: 0.7 MG/DL (ref 0.2–1.6)
BILIRUBIN, POC UA: NEGATIVE
BLOOD, POC UA: NEGATIVE
BUN SERPL-MCNC: 12 MG/DL (ref 7–22)
CALCIUM SERPL-MCNC: 10.5 MG/DL (ref 8–10.3)
CANNABINOIDS UR QL SCN: ABNORMAL
CHLORIDE SERPL-SCNC: 108 MMOL/L (ref 98–108)
CLARITY, UA: CLEAR
COCAINE UR QL SCN: NEGATIVE
COLOR, UA: YELLOW
CREAT SERPL-MCNC: 1 MG/DL (ref 0.6–1.2)
CREAT UR-MCNC: 152.3 MG/DL (ref 23–375)
GLUCOSE SERPL-MCNC: 93 MG/DL (ref 73–118)
GLUCOSE, POC UA: NEGATIVE
HCT, POC: NORMAL
HGB, POC: NORMAL (ref 14–18)
KETONES, POC UA: NEGATIVE
LEUKOCYTE EST, POC UA: NEGATIVE
MCH, POC: NORMAL
MCHC, POC: NORMAL
MCV, POC: NORMAL
METHADONE UR QL SCN: NEGATIVE
MPV, POC: NORMAL
NITRITE, POC UA: NEGATIVE
OPIATES UR QL SCN: NEGATIVE
PCP UR QL: NEGATIVE
PH UR STRIP: 7 [PH] (ref 5–8)
POC ALT (SGPT): 14 U/L (ref 10–47)
POC ALT (SGPT): 19 U/L (ref 10–47)
POC AMYLASE: 87 U/L (ref 14–97)
POC AST (SGOT): 29 U/L (ref 11–38)
POC AST (SGOT): 30 U/L (ref 11–38)
POC GGT: 14 U/L (ref 5–65)
POC PLATELET COUNT: NORMAL
POC TCO2: 28 MMOL/L (ref 18–33)
POTASSIUM BLD-SCNC: 4.6 MMOL/L (ref 3.6–5.1)
PROTEIN, POC UA: NEGATIVE
PROTEIN, POC: 8 G/DL (ref 6.4–8.1)
PROTEIN, POC: 8.3 G/DL (ref 6.4–8.1)
RBC, POC: NORMAL
RDW, POC: NORMAL
SODIUM BLD-SCNC: 143 MMOL/L (ref 128–145)
SPECIFIC GRAVITY, POC UA: 1.02 (ref 1–1.03)
UROBILINOGEN, POC UA: 0.2 E.U./DL
WBC, POC: NORMAL

## 2025-04-25 PROCEDURE — 82150 ASSAY OF AMYLASE: CPT | Mod: ER

## 2025-04-25 PROCEDURE — 85025 COMPLETE CBC W/AUTO DIFF WBC: CPT | Mod: ER

## 2025-04-25 PROCEDURE — 96374 THER/PROPH/DIAG INJ IV PUSH: CPT | Mod: ER

## 2025-04-25 PROCEDURE — 96372 THER/PROPH/DIAG INJ SC/IM: CPT | Performed by: EMERGENCY MEDICINE

## 2025-04-25 PROCEDURE — 96361 HYDRATE IV INFUSION ADD-ON: CPT | Mod: ER

## 2025-04-25 PROCEDURE — 25000003 PHARM REV CODE 250: Mod: ER | Performed by: EMERGENCY MEDICINE

## 2025-04-25 PROCEDURE — 82040 ASSAY OF SERUM ALBUMIN: CPT | Mod: 59,ER

## 2025-04-25 PROCEDURE — 63600175 PHARM REV CODE 636 W HCPCS: Mod: ER | Performed by: EMERGENCY MEDICINE

## 2025-04-25 PROCEDURE — 80053 COMPREHEN METABOLIC PANEL: CPT | Mod: ER

## 2025-04-25 PROCEDURE — 80307 DRUG TEST PRSMV CHEM ANLYZR: CPT | Performed by: EMERGENCY MEDICINE

## 2025-04-25 PROCEDURE — 99284 EMERGENCY DEPT VISIT MOD MDM: CPT | Mod: 25,ER

## 2025-04-25 RX ORDER — KETOROLAC TROMETHAMINE 30 MG/ML
30 INJECTION, SOLUTION INTRAMUSCULAR; INTRAVENOUS
Status: COMPLETED | OUTPATIENT
Start: 2025-04-25 | End: 2025-04-25

## 2025-04-25 RX ORDER — BUPRENORPHINE AND NALOXONE 8; 2 MG/1; MG/1
1 FILM, SOLUBLE BUCCAL; SUBLINGUAL DAILY
COMMUNITY
Start: 2025-04-09 | End: 2025-04-25

## 2025-04-25 RX ORDER — PROMETHAZINE HYDROCHLORIDE 25 MG/1
25 SUPPOSITORY RECTAL EVERY 6 HOURS PRN
Qty: 10 SUPPOSITORY | Refills: 0 | Status: SHIPPED | OUTPATIENT
Start: 2025-04-25

## 2025-04-25 RX ORDER — BUPRENORPHINE AND NALOXONE 8; 2 MG/1; MG/1
1 FILM, SOLUBLE BUCCAL; SUBLINGUAL 3 TIMES DAILY
Qty: 21 FILM | Refills: 0 | Status: SHIPPED | OUTPATIENT
Start: 2025-04-25 | End: 2025-05-02

## 2025-04-25 RX ORDER — DICYCLOMINE HYDROCHLORIDE 20 MG/1
20 TABLET ORAL 3 TIMES DAILY PRN
Qty: 20 TABLET | Refills: 0 | Status: SHIPPED | OUTPATIENT
Start: 2025-04-25 | End: 2025-05-25

## 2025-04-25 RX ORDER — ONDANSETRON HYDROCHLORIDE 2 MG/ML
8 INJECTION, SOLUTION INTRAVENOUS
Status: COMPLETED | OUTPATIENT
Start: 2025-04-25 | End: 2025-04-25

## 2025-04-25 RX ORDER — DICYCLOMINE HYDROCHLORIDE 10 MG/ML
20 INJECTION INTRAMUSCULAR
Status: COMPLETED | OUTPATIENT
Start: 2025-04-25 | End: 2025-04-25

## 2025-04-25 RX ORDER — ONDANSETRON 8 MG/1
8 TABLET, ORALLY DISINTEGRATING ORAL EVERY 6 HOURS PRN
Qty: 12 TABLET | Refills: 0 | Status: SHIPPED | OUTPATIENT
Start: 2025-04-25 | End: 2025-04-28

## 2025-04-25 RX ADMIN — SODIUM CHLORIDE 1000 ML: 9 INJECTION, SOLUTION INTRAVENOUS at 12:04

## 2025-04-25 RX ADMIN — KETOROLAC TROMETHAMINE 30 MG: 30 INJECTION, SOLUTION INTRAMUSCULAR; INTRAVENOUS at 12:04

## 2025-04-25 RX ADMIN — DICYCLOMINE HYDROCHLORIDE 20 MG: 10 INJECTION, SOLUTION INTRAMUSCULAR at 12:04

## 2025-04-25 RX ADMIN — ONDANSETRON 8 MG: 2 INJECTION INTRAMUSCULAR; INTRAVENOUS at 12:04

## 2025-04-25 NOTE — DISCHARGE INSTRUCTIONS
Please make your appointment on May 2nd for ongoing management and Suboxone prescriptions.   Please sign up for and monitor all results on [facility] patient portal.    Please return to the ED for any new, concerning symptoms, or worsening symptoms.    Please follow-up with your primary care provider within 1 day.  An ER visit can not replace the evaluation by your primary care physician.    In the emergency department it is our goal to rule out life-threatening conditions and make sure that you are safe to be discharged home.    Many medical conditions are difficult to diagnose and may be impossible to diagnosed during a single ER visit.  Many health conditions start with nonspecific symptoms and can only be diagnosed on follow-up visits with your primary care physician or specialist.    Please be aware that all medical conditions can change and we can not predict how you will be feeling tomorrow or the next day.   If you have any worsening or new symptoms you should not hesitate to return to the emergency department for re-evaluation.  Be sure to follow up with your primary care physician for recheck and review any labs or imaging that were performed today.  It is very common for us to identify non emergent incidental findings on labs and imaging which must be followed up with your primary care physician.   If you do not have a primary care physician, you may contact the 1 listed on your discharge paperwork or you can also call the [facility] clinic appointment desk at [phone] to schedule an appointment.

## 2025-04-25 NOTE — Clinical Note
"Daniele Curran"Doug was seen and treated in our emergency department on 4/25/2025.  He may return to work on 04/26/2025.       If you have any questions or concerns, please don't hesitate to call.      Tashia Viramontes, DO"

## 2025-04-25 NOTE — ED PROVIDER NOTES
Encounter Date: 4/25/2025    SCRIBE #1 NOTE: I, Tacos Cameron, am scribing for, and in the presence of,  Tashia Viramontes DO. I have scribed the following portions of the note - Other sections scribed: HPI,ROS,PE.       History     Chief Complaint   Patient presents with    Abdominal Pain     Pt reports N/V/H, pt unable to get suboxone     Daniele Camacho is a 30 y.o. male with Hx of HTN and opoid abuse, who presents to the ED for chief complaint of nausea onset PTA. Patient reports associated symptoms of frequent bowel movements and vomiting. Patient reports that he has run out of his Suboxone, he reports that he was originally on a Suboxone program but states that he did not go to a follow up appointment which led to his dismissal from the program. Patient reports that he did not attend the appointment because he thought that he could manage his addiction(addicted to Narco's) on his own. Patient reports that he attempted to get re-enrolled in the program but states that there is a 6 month wait. Patient reports that he has an appointment on 5/2/25 and needs a Suboxone prescription that will last him till that date. Patient was seen at the [facility] ED on [facility] on 4/16/25 and at an urgent care on 4/21/25 for refills on his Suboxone. Patient denies the use of tobacco but endorses the monthly use of marijuana and the occasional use of alcohol.      The history is provided by the patient. No  was used.     Review of patient's allergies indicates:  No Known Allergies  Past Medical History:   Diagnosis Date    Hypertension     Insomnia     Opioid abuse     Sickle cell trait     Urinary retention 06/27/2024     Past Surgical History:   Procedure Laterality Date    FRACTURE SURGERY  2014    Wrist    INCISION OF PENIS N/A 06/27/2024    Procedure: Incision, Penis, DIstal Penile Shunt;  Surgeon: Donald Smith MD;  Location: [facility] OR;  Service: Urology;  Laterality: N/A;  11 blade, 50 cc  syringes     Family History   Problem Relation Name Age of Onset    Arthritis Mother Katia     Sickle cell trait Mother Katia     No Known Problems Father      No Known Problems Brother      No Known Problems Brother       Social History[1]  Review of Systems   Constitutional:  Negative for fever.   HENT:  Negative for rhinorrhea and sore throat.    Respiratory:  Negative for shortness of breath.    Cardiovascular:  Negative for leg swelling.   Gastrointestinal:  Positive for nausea and vomiting.        (+)Frequent bowel movements   Skin:  Negative for rash.   Neurological:  Negative for numbness.   All other systems reviewed and are negative.      Physical Exam     Initial Vitals [04/25/25 1043]   BP Pulse Resp Temp SpO2   (!) 148/91 73 20 98.7 °F (37.1 °C) 98 %      MAP       --         Patient gave consent to have physical exam performed.   Physical Exam    Nursing note and vitals reviewed.  Constitutional: He appears well-developed and well-nourished. No distress.   HENT:   Head: Normocephalic and atraumatic.   Right Ear: External ear normal.   Left Ear: External ear normal.   Nose: Nose normal. Mouth/Throat: Oropharynx is clear and moist.   Eyes: Conjunctivae and EOM are normal. Pupils are equal, round, and reactive to light.   Neck: Neck supple.   Normal range of motion.  Cardiovascular:  Normal rate, regular rhythm, normal heart sounds and normal pulses.     Exam reveals no gallop and no friction rub.       No murmur heard.  Pulmonary/Chest: Effort normal and breath sounds normal. No respiratory distress. He has no wheezes. He has no rhonchi. He has no rales.   Abdominal: Abdomen is soft. Bowel sounds are normal. There is no abdominal tenderness. There is no rebound and no guarding.   Musculoskeletal:         General: No tenderness or edema. Normal range of motion.      Cervical back: Normal range of motion and neck supple.     Neurological: He is alert and oriented to person, place, and time. No cranial  "nerve deficit.   Skin: Skin is warm and dry. Capillary refill takes less than 2 seconds. No rash noted.   Psychiatric: He has a normal mood and affect. His behavior is normal.         ED Course   Procedures  Labs Reviewed   DRUG SCREEN PANEL, URINE EMERGENCY - Abnormal       Result Value    Benzodiazepine, Urine Negative      Methadone, Urine Negative      Cocaine, Urine Negative      Opiates, Urine Negative      Barbiturates, Urine Negative      Amphetamines, Urine Negative      THC Presumptive Positive (*)     Phencyclidine, Urine Negative      Urine Creatinine 152.3      Narrative:     This screen includes the following classes of drugs at the listed cut-off:     Benzodiazepines:        200 ng/ml   Methadone:              300 ng/ml   Cocaine metabolite:     300 ng/ml   Opiates:                300 ng/ml   Barbiturates:           200 ng/ml   Amphetamines:           1000 ng/ml   Marijuana metabs (THC): 50 ng/ml   Phencyclidine (PCP):    25 ng/ml     This is a screening test. If results do not correlate with clinical presentation, then a confirmatory send out test is advised.    This report is intended for use in clinical monitoring and management of patients. It is not intended for use in employment related drug testing."   POCT CMP - Abnormal    Albumin, POC 4.3      Alkaline Phosphatase, POC 44      ALT (SGPT), POC 14      AST (SGOT), POC 29      POC BUN 12      Calcium, POC 10.5 (*)     POC Chloride 108      POC Creatinine 1.0      POC Glucose 93      POC Potassium 4.6      POC Sodium 143      Bilirubin, POC 0.6      POC TCO2 28      Protein, POC 8.3 (*)    POCT CBC    Hematocrit        Hemoglobin        RBC        WBC        MCV        MCH, POC        MCHC        RDW-CV        Platelet Count, POC        MPV       POCT CMP   POCT LIVER PANEL   POCT LIVER PANEL    Albumin, POC 4.5      Alkaline Phosphatase, POC 45      ALT (SGPT), POC 19      Amylase, POC 87      AST (SGOT), POC 30      POC GGT 14      Bilirubin, " POC 0.7      Protein, POC 8.0     POCT URINALYSIS W/O SCOPE    Glucose, UA Negative      Bilirubin, UA Negative      Ketones, UA Negative      Spec Grav UA 1.020      Blood, UA Negative      PH, UA 7.0      Protein, UA Negative      Urobilinogen, UA 0.2      Nitrite, UA Negative      Leukocytes, UA Negative      Color, UA POC Yellow      Clarity, UA, POC Clear            Imaging Results    None          Medications   dicyclomine injection 20 mg (20 mg Intramuscular Given 4/25/25 1208)   ondansetron injection 8 mg (8 mg Intravenous Given 4/25/25 1209)   ketorolac injection 30 mg (30 mg Intramuscular Given 4/25/25 1209)   sodium chloride 0.9% bolus 1,000 mL 1,000 mL (0 mLs Intravenous Stopped 4/25/25 1255)     Medical Decision Making  Amount and/or Complexity of Data Reviewed  External Data Reviewed: notes.     Details: See HPI  Labs: ordered.    Risk  Prescription drug management.    Medical Decision Making:    This is an evaluation of a 30 y.o. male that presents to the Emergency Department for   Chief Complaint   Patient presents with    Abdominal Pain     Pt reports N/V/H, pt unable to get suboxone     The patient is a non-toxic and well appearing patient. On physical exam, patient appears well hydrated with moist mucus membranes. Breath sounds are clear and equal bilaterally with no adventitious breath sounds, tachypnea or respiratory distress. Regular rate and rhythm. No murmurs. Abdomen soft and non tender. Patient is tolerating PO without difficulty. Physical exam otherwise as above.     I have reviewed vital signs and nursing notes.   Vital Signs Are Reassuring.     Based on the patient's symptoms, I am considering and evaluating for the following differential diagnoses: gastritis, gastroenteritis, dehydration or Suboxone withdrawal.     ED Course:Treatment in the ED included Physical Exam and medications given in ED  Medications   dicyclomine injection 20 mg (20 mg Intramuscular Given 4/25/25 1208)    ondansetron injection 8 mg (8 mg Intravenous Given 4/25/25 1209)   ketorolac injection 30 mg (30 mg Intramuscular Given 4/25/25 1209)   sodium chloride 0.9% bolus 1,000 mL 1,000 mL (0 mLs Intravenous Stopped 4/25/25 1255)   .   Patient reports feeling better after treatment in the ER.   Vital signs reviewed  Nurse's notes reviewed  External Data/Documents Reviewed: Previous medical records and vital signs reviewed, see HPI and Physical exam.   Labs: ordered and reviewed.  UA negative leukocytes and nitrites.    Prescription monitoring program queried.    I consulted patient's primary care provider.  Dick SNOW.  Referral is in for addiction treatment.  He will follow up on Monday to make sure patient is able to get a provided that can write him Suboxone moving forward.    Risk  Diagnosis or treatment significantly limited by the following social determinants of health: Body mass index is 27.12 kg/m².     In shared decision making with the patient, we discussed treatment, prescriptions, labs, and imaging results.  In lengthy discussion with patient I discussed need for ongoing addiction management and establishing a provider to write his Suboxone moving forward.  Patient states he thought he could get over his addiction without the Suboxone however it has not gone well.  Patient states he will follow up as soon as he can get into a provider.  I advised patient that I will not be able to write additional Suboxone prescriptions through the ED. patient has had multiple Suboxone prescriptions written by multiple providers in the last 6 months.      Discharge home with   ED Prescriptions       Medication Sig Dispense Start Date End Date Auth. Provider    buprenorphine-naloxone 8-2 mg (SUBOXONE) 8-2 mg Place 1 Film under the tongue 3 (three) times daily. for 7 days 21 Film 4/25/2025 5/2/2025 Tashia Viramontes DO    promethazine (PHENERGAN) 25 MG suppository Place 1 suppository (25 mg total) rectally every 6 (six) hours  as needed for Nausea. 10 suppository 4/25/2025 -- Tashia Viramontes DO    dicyclomine (BENTYL) 20 mg tablet Take 1 tablet (20 mg total) by mouth 3 (three) times daily as needed (As needed for abdominal pain, diarrhea, and cramps). 20 tablet 4/25/2025 5/25/2025 Tashia Viramontes DO    ondansetron (ZOFRAN-ODT) 8 MG TbDL Take 1 tablet (8 mg total) by mouth every 6 (six) hours as needed (As needed for nausea vomiting). 12 tablet 4/25/2025 4/28/2025 Tashia Viramontes DO          Fill and take prescriptions as directed.  Return to the ED if symptoms worsen or do not resolve.   Answered questions and discussed discharge plan.    Patient reports resolution of symptoms and is ready for discharge.  Follow up with PCP/specialist in 1 day    The following labs and imaging were reviewed:      Admission on 04/25/2025, Discharged on 04/25/2025   Component Date Value Ref Range Status    Albumin, POC 04/25/2025 4.5  3.3 - 5.5 g/dL Final    Alkaline Phosphatase, POC 04/25/2025 45  42 - 141 U/L Final    ALT (SGPT), POC 04/25/2025 19  10 - 47 U/L Final    Amylase, POC 04/25/2025 87  14 - 97 U/L Final    AST (SGOT), POC 04/25/2025 30  11 - 38 U/L Final    POC GGT 04/25/2025 14  5 - 65 U/L Final    Bilirubin, POC 04/25/2025 0.7  0.2 - 1.6 mg/dL Final    Protein, POC 04/25/2025 8.0  6.4 - 8.1 g/dL Final    Albumin, POC 04/25/2025 4.3  3.3 - 5.5 g/dL Final    Alkaline Phosphatase, POC 04/25/2025 44  42 - 141 U/L Final    ALT (SGPT), POC 04/25/2025 14  10 - 47 U/L Final    AST (SGOT), POC 04/25/2025 29  11 - 38 U/L Final    POC BUN 04/25/2025 12  7 - 22 mg/dL Final    Calcium, POC 04/25/2025 10.5 (H)  8.0 - 10.3 mg/dL Final    POC Chloride 04/25/2025 108  98 - 108 mmol/L Final    POC Creatinine 04/25/2025 1.0  0.6 - 1.2 mg/dL Final    POC Glucose 04/25/2025 93  73 - 118 mg/dL Final    POC Potassium 04/25/2025 4.6  3.6 - 5.1 mmol/L Final    POC Sodium 04/25/2025 143  128 - 145 mmol/L Final    Bilirubin, POC 04/25/2025 0.6  0.2 - 1.6 mg/dL Final    POC  TCO2 04/25/2025 28  18 - 33 mmol/L Final    Protein, POC 04/25/2025 8.3 (H)  6.4 - 8.1 g/dL Final    Glucose, UA 04/25/2025 Negative  Negative Final    Bilirubin, UA 04/25/2025 Negative  Negative Final    Ketones, UA 04/25/2025 Negative  Negative Final    Spec Grav UA 04/25/2025 1.020  1.005 - 1.030 Final    Blood, UA 04/25/2025 Negative  Negative Final    PH, UA 04/25/2025 7.0  5.0 - 8.0 Final    Protein, UA 04/25/2025 Negative  Negative Final    Urobilinogen, UA 04/25/2025 0.2  <=1.0 E.U./dL Final    Nitrite, UA 04/25/2025 Negative  Negative Final    Leukocytes, UA 04/25/2025 Negative  Negative Final    Color, UA POC 04/25/2025 Yellow  Yellow, Straw, Oriana Final    Clarity, UA, POC 04/25/2025 Clear  Clear Final    Benzodiazepine, Urine 04/25/2025 Negative  Negative Final    Methadone, Urine 04/25/2025 Negative  Negative Final    Cocaine, Urine 04/25/2025 Negative  Negative Final    Opiates, Urine 04/25/2025 Negative  Negative Final    Barbiturates, Urine 04/25/2025 Negative  Negative Final    Amphetamines, Urine 04/25/2025 Negative  Negative Final    THC 04/25/2025 Presumptive Positive (A)  Negative Final    Phencyclidine, Urine 04/25/2025 Negative  Negative Final    Urine Creatinine 04/25/2025 152.3  23.0 - 375.0 mg/dL Final        Imaging Results    None              Scribe Attestation:   Scribe #1: I performed the above scribed service and the documentation accurately describes the services I performed. I attest to the accuracy of the note.               I, Dr. Tashia Viramontes, personally performed the services described in this documentation. This document was produced by a scribe under my direction and in my presence. All medical record entries made by the scribe were at my direction and in my presence.  I have reviewed the chart and agree that the record reflects my personal performance and is accurate and complete. Tashia Viramontes DO.     04/26/2025 1:43 PM    DISCLAIMER: This note was prepared with  MModal voice recognition transcription software. Garbled syntax, mangled pronouns, and other bizarre constructions may be attributed to that software system.               Clinical Impression:  Final diagnoses:  [F11.93] Buprenorphine and naloxone withdrawal (Primary)  [Z76.0] Encounter for medication refill  [R11.2, R19.7] Nausea vomiting and diarrhea  [F11.23] Opioid dependence with withdrawal          ED Disposition Condition    Discharge Stable          ED Prescriptions       Medication Sig Dispense Start Date End Date Auth. Provider    buprenorphine-naloxone 8-2 mg (SUBOXONE) 8-2 mg Place 1 Film under the tongue 3 (three) times daily. for 7 days 21 Film 4/25/2025 5/2/2025 Tashia Viramontes DO    promethazine (PHENERGAN) 25 MG suppository Place 1 suppository (25 mg total) rectally every 6 (six) hours as needed for Nausea. 10 suppository 4/25/2025 -- Tashia Viramontes DO    dicyclomine (BENTYL) 20 mg tablet Take 1 tablet (20 mg total) by mouth 3 (three) times daily as needed (As needed for abdominal pain, diarrhea, and cramps). 20 tablet 4/25/2025 5/25/2025 Tashia Viramontes DO    ondansetron (ZOFRAN-ODT) 8 MG TbDL Take 1 tablet (8 mg total) by mouth every 6 (six) hours as needed (As needed for nausea vomiting). 12 tablet 4/25/2025 4/28/2025 Tashia Viramontes DO          Follow-up Information       Follow up With Specialties Details Why Contact Info Additional Information    [facility] -  Schedule an appointment as soon as possible for a visit in 1 day  [address]  [place] [address]  [phone]       [facility] Schedule an appointment as soon as possible for a visit in 1 day Please establish a primary care physician [address]  [facility] [address]  [facility] [address] [phone] Please park in [address] or [address] and use [facility]. Take Medical Office [facility]. elevators.    [facility] Emergency Dept Emergency Medicine Go to  Please go to [facility] emergency  department if symptoms worsen [address] [facility] [place] [id number] [phone]                Tashia Viramontes DO  04/26/25 1340         [1]   Social History  Tobacco Use    Smoking status: Never    Smokeless tobacco: Never   Substance Use Topics    Alcohol use: Not Currently     Comment: OCC    Drug use: Yes     Frequency: 3.0 times per week     Types: Marijuana        Tashia Viramontes DO  04/26/25 8320

## (undated) DEVICE — TRAY FOLEY 16FR INFECTION CONT

## (undated) DEVICE — DRESSING XEROFORM NONADH 1X8IN

## (undated) DEVICE — GLOVE SURGICAL LATEX SZ 6.5

## (undated) DEVICE — SYR 10CC LUER LOCK

## (undated) DEVICE — BLANKET UPPER BODY 78.7X29.9IN

## (undated) DEVICE — JELLY LUBRICANT STERILE 4 OZ

## (undated) DEVICE — GOWN NONREINF SET-IN SLV XL

## (undated) DEVICE — GOWN B1 X-LG X-LONG

## (undated) DEVICE — BOOT SUTURE AID

## (undated) DEVICE — ELECTRODE REM PLYHSV RETURN 9

## (undated) DEVICE — BANDAGE ROLL COTTN 4.5INX4.1YD

## (undated) DEVICE — NDL HYPO REG 25G X 1 1/2

## (undated) DEVICE — BNDG COFLEX FOAM LF2 ST 3X5YD

## (undated) DEVICE — SOL IRR SOD CHL .9% POUR

## (undated) DEVICE — Device

## (undated) DEVICE — PACK ENDOSCOPY GENERAL

## (undated) DEVICE — NDL ELECTRODE

## (undated) DEVICE — COVER OVERHEAD SURG LT BLUE

## (undated) DEVICE — TOWEL OR XRAY BLUE 17X26IN